# Patient Record
Sex: FEMALE | Employment: UNEMPLOYED | URBAN - METROPOLITAN AREA
[De-identification: names, ages, dates, MRNs, and addresses within clinical notes are randomized per-mention and may not be internally consistent; named-entity substitution may affect disease eponyms.]

---

## 2021-04-08 ENCOUNTER — HOSPITAL ENCOUNTER (EMERGENCY)
Facility: CLINIC | Age: 26
Discharge: HOME | End: 2021-04-09
Attending: EMERGENCY MEDICINE | Admitting: EMERGENCY MEDICINE
Payer: COMMERCIAL

## 2021-04-08 VITALS
DIASTOLIC BLOOD PRESSURE: 88 MMHG | HEART RATE: 92 BPM | RESPIRATION RATE: 16 BRPM | OXYGEN SATURATION: 99 % | TEMPERATURE: 97.7 F | WEIGHT: 165 LBS | SYSTOLIC BLOOD PRESSURE: 128 MMHG | BODY MASS INDEX: 26.63 KG/M2

## 2021-04-08 DIAGNOSIS — F33.2 SEVERE EPISODE OF RECURRENT MAJOR DEPRESSIVE DISORDER, WITHOUT PSYCHOTIC FEATURES (H): ICD-10-CM

## 2021-04-08 DIAGNOSIS — Z65.9 OTHER SOCIAL STRESSOR: ICD-10-CM

## 2021-04-08 LAB
ANION GAP SERPL CALCULATED.3IONS-SCNC: 5 MMOL/L (ref 3–14)
BASOPHILS # BLD AUTO: 0 10E9/L (ref 0–0.2)
BASOPHILS NFR BLD AUTO: 0.4 %
BUN SERPL-MCNC: 13 MG/DL (ref 7–30)
CALCIUM SERPL-MCNC: 9.3 MG/DL (ref 8.5–10.1)
CHLORIDE SERPL-SCNC: 108 MMOL/L (ref 94–109)
CO2 SERPL-SCNC: 25 MMOL/L (ref 20–32)
CREAT SERPL-MCNC: 0.69 MG/DL (ref 0.52–1.04)
DIFFERENTIAL METHOD BLD: ABNORMAL
EOSINOPHIL # BLD AUTO: 0.1 10E9/L (ref 0–0.7)
EOSINOPHIL NFR BLD AUTO: 1.1 %
ERYTHROCYTE [DISTWIDTH] IN BLOOD BY AUTOMATED COUNT: 16.4 % (ref 10–15)
GFR SERPL CREATININE-BSD FRML MDRD: >90 ML/MIN/{1.73_M2}
GLUCOSE BLDC GLUCOMTR-MCNC: 88 MG/DL (ref 70–99)
GLUCOSE SERPL-MCNC: 84 MG/DL (ref 70–99)
HCT VFR BLD AUTO: 38.8 % (ref 35–47)
HGB BLD-MCNC: 12.4 G/DL (ref 11.7–15.7)
IMM GRANULOCYTES # BLD: 0 10E9/L (ref 0–0.4)
IMM GRANULOCYTES NFR BLD: 0.3 %
LYMPHOCYTES # BLD AUTO: 2.5 10E9/L (ref 0.8–5.3)
LYMPHOCYTES NFR BLD AUTO: 36 %
MCH RBC QN AUTO: 27.2 PG (ref 26.5–33)
MCHC RBC AUTO-ENTMCNC: 32 G/DL (ref 31.5–36.5)
MCV RBC AUTO: 85 FL (ref 78–100)
MONOCYTES # BLD AUTO: 0.6 10E9/L (ref 0–1.3)
MONOCYTES NFR BLD AUTO: 8.7 %
NEUTROPHILS # BLD AUTO: 3.8 10E9/L (ref 1.6–8.3)
NEUTROPHILS NFR BLD AUTO: 53.5 %
NRBC # BLD AUTO: 0 10*3/UL
NRBC BLD AUTO-RTO: 0 /100
PLATELET # BLD AUTO: 230 10E9/L (ref 150–450)
POTASSIUM SERPL-SCNC: 3.2 MMOL/L (ref 3.4–5.3)
RBC # BLD AUTO: 4.56 10E12/L (ref 3.8–5.2)
SODIUM SERPL-SCNC: 138 MMOL/L (ref 133–144)
WBC # BLD AUTO: 7.1 10E9/L (ref 4–11)

## 2021-04-08 PROCEDURE — 85025 COMPLETE CBC W/AUTO DIFF WBC: CPT | Performed by: EMERGENCY MEDICINE

## 2021-04-08 PROCEDURE — 96361 HYDRATE IV INFUSION ADD-ON: CPT

## 2021-04-08 PROCEDURE — 99285 EMERGENCY DEPT VISIT HI MDM: CPT | Mod: 25

## 2021-04-08 PROCEDURE — 999N001017 HC STATISTIC GLUCOSE BY METER IP

## 2021-04-08 PROCEDURE — 90791 PSYCH DIAGNOSTIC EVALUATION: CPT

## 2021-04-08 PROCEDURE — 96360 HYDRATION IV INFUSION INIT: CPT

## 2021-04-08 PROCEDURE — 258N000003 HC RX IP 258 OP 636: Performed by: EMERGENCY MEDICINE

## 2021-04-08 PROCEDURE — 80048 BASIC METABOLIC PNL TOTAL CA: CPT | Performed by: EMERGENCY MEDICINE

## 2021-04-08 RX ADMIN — SODIUM CHLORIDE 1000 ML: 9 INJECTION, SOLUTION INTRAVENOUS at 23:08

## 2021-04-09 ASSESSMENT — ENCOUNTER SYMPTOMS
APPETITE CHANGE: 1
DYSPHORIC MOOD: 1

## 2021-04-09 NOTE — ED PROVIDER NOTES
"  History     Chief Complaint:  Mental Health Problem     HPI   Srinivas Noble is a 25 year old female with history of depression, anxiety, and anemia who presents with mental health problem. The patient states that recently she has felt as if her brain and body are \"shutting down.\" She says she feels heavy and does not have the strength to eat or drink. She says she is depressed but is motivated by her children to not consider suicide. 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 She also notes that she has chronic pain.     Review of Systems   Constitutional: Positive for appetite change.   Psychiatric/Behavioral: Positive for dysphoric mood and suicidal ideas.   All other systems reviewed and are negative.    Allergies:  Vitamin K  Iron  Ferumoxytol    Medications:  Gabapentin  Toradol    Past Medical History:    Anemia  h/o preeclampsia  History of congenital heart defect  Generalized anxiety disorder  Eczema  Depression     Past Surgical History:    Appendectomy x 3   section  Laparoscopic hysterectomy      Family History:    Psychiatric illness  ADHD    Social History:  Accompanied by brother.  Has children.  Uses marijuana.    Physical Exam     Patient Vitals for the past 24 hrs:   BP Temp Temp src Pulse Resp SpO2 Weight   21 2249 128/88 97.7  F (36.5  C) Temporal 92 16 99 % 74.8 kg (165 lb)     Physical Exam  Nursing note and vitals reviewed.  Constitutional: Cooperative. Laying on sided.    HENT:   Mouth/Throat: Mucous membranes are normal.   Cardiovascular: Normal rate, regular rhythm and normal heart sounds.    Pulmonary/Chest: Effort normal and breath sounds normal. No respiratory distress. No wheezes.   Abdominal: Soft. Normal appearance. There is no tenderness.   Neurological: Alert. Oriented x4.  Gait normal.   Skin: Skin is warm and dry.  Psychiatric: Depressed mood and affect. Suicidal thoughts without " plan.    Emergency Department Course     Laboratory:  CBC: WBC 7.1, HGB 12.4,   BMP: Potassium 3.2 (L) o/w WNL (Creatinine 0.69)      Reviewed:  I reviewed nursing notes, vitals, past medical history and care everywhere    Assessments:  2335 I obtained history and examined the patient as noted above.   0132 I rechecked the patient and explained findings and plan of care.     Consults:   0127 I consulted DEC regarding their assessment of the patient.    Interventions:  2308 NS 1000 mL IV    Disposition:  The patient was discharged to home.     Impression & Plan     Medical Decision Making:  Srinivas Noble is a 25 year old female who presents with depression symptoms. She has been evaluated by our DEC representative who was able to illicit more history. It sounds like she is having significant marriage issues and is considering leaving her . Recent stressors include the presence of a foster child that recently left as well as three biological children ages 6, 3, and 1. No suicidal plan. She is forward-thinking. No signs of psychosis. No indication for hospitalization. Our DEC representative was not able to schedule a definite outpatient mental health follow up appointment due to the patient's request for specifics in terms of the ethnicity of her therapist but they will have the care coordinator call her in the morning to continue to work on this. Patient has her brother with her who was able to provide safe transport home. Safety plan is going to be faxed to the patient and she will be discharged in stable condition.      Diagnosis:    ICD-10-CM    1. Severe episode of recurrent major depressive disorder, without psychotic features  F33.2    2. Social stressors  Z65.9          Scribe Disclosure:  I, Rachael Singh, am serving as a scribe at 11:43 PM on 4/8/2021 to document services personally performed by Addi Craig MD based on my observations and the provider's statements to me.      Addi Craig,  MD  04/09/21 0225

## 2021-04-09 NOTE — ED TRIAGE NOTES
Pt presents to ED with mental health concerns. States she has been very depressed. Has not been eating or drinking well lately. Feels lightheaded. Denies feeling suicidal as she has children she knows need her.

## 2021-04-12 ENCOUNTER — HOSPITAL ENCOUNTER (EMERGENCY)
Facility: CLINIC | Age: 26
Discharge: HOME | End: 2021-04-12
Attending: PHYSICIAN ASSISTANT | Admitting: PHYSICIAN ASSISTANT
Payer: COMMERCIAL

## 2021-04-12 VITALS
WEIGHT: 154.54 LBS | RESPIRATION RATE: 16 BRPM | OXYGEN SATURATION: 99 % | TEMPERATURE: 98.6 F | DIASTOLIC BLOOD PRESSURE: 75 MMHG | HEART RATE: 76 BPM | SYSTOLIC BLOOD PRESSURE: 163 MMHG | BODY MASS INDEX: 24.94 KG/M2

## 2021-04-12 DIAGNOSIS — Z20.822 EXPOSURE TO COVID-19 VIRUS: ICD-10-CM

## 2021-04-12 DIAGNOSIS — M54.9 BACK PAIN: ICD-10-CM

## 2021-04-12 PROCEDURE — 99282 EMERGENCY DEPT VISIT SF MDM: CPT

## 2021-04-12 ASSESSMENT — ENCOUNTER SYMPTOMS
BACK PAIN: 1
JOINT SWELLING: 1

## 2021-04-12 NOTE — ED PROVIDER NOTES
History   Chief Complaint:  Suspected Covid     HPI   Srinivas Noble is a 25 year old female who presents to the ED for evaluation of back pain and swelling.  Patient notes that she has been seen multiple times over the last few weeks for back pain, including in being evaluated here in the ED.  She has been following with a chiropractor as well as physical therapy.  She states that her back pain now has currently resolved, however she is concerned that the inflammation that she was experiencing in her back might be secondary to recent Covid.  She is wondering if she can get Covid antibody testing.      Review of Systems   Musculoskeletal: Positive for back pain and joint swelling.   All other systems reviewed and are negative.      Allergies:  Vitamin K  Iron  Centrum  Ferumoxytol    Medications:  Hydroxyzine  Tizanidine  Gabapentin     Past Medical History:    Iron deficiency anemia  Anxiety and depression   Eczema     Past Surgical History:    Appendectomy   section  Hysterectomy      Family History:    Father - hole in heart    Social History:  Accompanied to the ED by family.     Physical Exam     Patient Vitals for the past 24 hrs:   BP Temp Temp src Pulse Resp SpO2 Weight   21 1624 -- -- -- -- -- -- 70.1 kg (154 lb 8.7 oz)   21 1622 (!) 163/75 98.6  F (37  C) Temporal 76 16 99 % --       Physical Exam  Constitutional: Pleasant. Cooperative.  Eyes: Pupils equally round  HENT: Head is normal in appearance. Oropharynx is normal with moist mucus membranes.  Cardiovascular: Regular rate and rhythm without murmurs.  Respiratory: Normal respiratory effort, lungs clear to auscultation  Musculoskeletal: No asymmetry  Skin: Normal, without rash.  Neurologic: Cranial nerves grossly intact, normal cognition, no apparent deficits.  Psychiatric: Normal affect.  Nursing notes and vital signs reviewed.    Emergency Department Course     Emergency Department Course:    Reviewed:   I reviewed nursing  notes, vitals, past medical history and care everywhere    Assessments:  1735 I obtained history and examined the patient as noted above.     Disposition:  The patient was discharged to home.       Impression & Plan     Medical Decision Making:  Srinivas Noble is a 25 year old female who presents to the ED for evaluation of back pain and right lower extremity pain and swelling.  Patient has been seen previously for similar symptoms.  She is concerned that inflammation from her back might be secondary to recent Covid.  See HPI as above for additional details.  Vitals and physical exam as above.  We did discuss that Covid antibody test is not performed out of the emergency department.  Patient denies any symptoms at this time, denying any back pain or leg pain.  We discussed testing for Covid, however patient declined this.  We discussed potentially ordering lab work/imaging, however patient declined.  Ultimately, patient felt comfortable with close outpatient follow-up with recurrence of symptoms.  I felt this was very reasonable. Discussed reasons to return. All questions answered. Patient discharged to home in stable condition.    Covid-19  Srinivas Noble was evaluated during a global COVID-19 pandemic, which necessitated consideration that the patient might be at risk for infection with the SARS-CoV-2 virus that causes COVID-19.   Applicable protocols for evaluation were followed during the patient's care.   COVID-19 was considered as part of the patient's evaluation. The plan for testing is:  a test was obtained at a previous visit and reviewed & considered today.    Diagnosis:    ICD-10-CM    1. Back pain  M54.9    2. Exposure to COVID-19 virus  Z20.822      Scribe Disclosure:  I, Vanna Schafer, am serving as a scribe at 5:37 PM on 4/12/2021 to document services personally performed by Ronny Cedeno PA-C based on my observations and the provider's statements to me.     This record was created at least in  part using electronic voice recognition software, so please excuse any typographical errors.       Ronny Cedeno PA-C  04/12/21 5352

## 2021-04-12 NOTE — ED TRIAGE NOTES
Pt here for possible COVID exposure. A+Ox4, no acute signs of distress. States that she has generalized weakness and back pain.

## 2021-04-14 ENCOUNTER — HOSPITAL ENCOUNTER (EMERGENCY)
Facility: CLINIC | Age: 26
Discharge: HOME | End: 2021-04-14
Attending: PHYSICIAN ASSISTANT | Admitting: PHYSICIAN ASSISTANT
Payer: COMMERCIAL

## 2021-04-14 VITALS
TEMPERATURE: 98.9 F | SYSTOLIC BLOOD PRESSURE: 127 MMHG | HEART RATE: 100 BPM | DIASTOLIC BLOOD PRESSURE: 91 MMHG | BODY MASS INDEX: 24.75 KG/M2 | HEIGHT: 66 IN | WEIGHT: 154 LBS | RESPIRATION RATE: 20 BRPM | OXYGEN SATURATION: 100 %

## 2021-04-14 DIAGNOSIS — R63.8 POOR FLUID INTAKE: ICD-10-CM

## 2021-04-14 DIAGNOSIS — F33.1 MODERATE EPISODE OF RECURRENT MAJOR DEPRESSIVE DISORDER (H): Primary | ICD-10-CM

## 2021-04-14 LAB
AMPHETAMINES UR QL SCN: NEGATIVE
ANION GAP SERPL CALCULATED.3IONS-SCNC: 3 MMOL/L (ref 3–14)
BARBITURATES UR QL: NEGATIVE
BASOPHILS # BLD AUTO: 0 10E9/L (ref 0–0.2)
BASOPHILS NFR BLD AUTO: 0.3 %
BENZODIAZ UR QL: NEGATIVE
BUN SERPL-MCNC: 8 MG/DL (ref 7–30)
CALCIUM SERPL-MCNC: 9 MG/DL (ref 8.5–10.1)
CANNABINOIDS UR QL SCN: POSITIVE
CHLORIDE SERPL-SCNC: 106 MMOL/L (ref 94–109)
CO2 SERPL-SCNC: 28 MMOL/L (ref 20–32)
COCAINE UR QL: NEGATIVE
CREAT SERPL-MCNC: 0.65 MG/DL (ref 0.52–1.04)
DIFFERENTIAL METHOD BLD: ABNORMAL
EOSINOPHIL # BLD AUTO: 0 10E9/L (ref 0–0.7)
EOSINOPHIL NFR BLD AUTO: 0.5 %
ERYTHROCYTE [DISTWIDTH] IN BLOOD BY AUTOMATED COUNT: 16.5 % (ref 10–15)
GFR SERPL CREATININE-BSD FRML MDRD: >90 ML/MIN/{1.73_M2}
GLUCOSE SERPL-MCNC: 99 MG/DL (ref 70–99)
HCG UR QL: NEGATIVE
HCT VFR BLD AUTO: 39.3 % (ref 35–47)
HGB BLD-MCNC: 12.3 G/DL (ref 11.7–15.7)
IMM GRANULOCYTES # BLD: 0 10E9/L (ref 0–0.4)
IMM GRANULOCYTES NFR BLD: 0.3 %
LABORATORY COMMENT REPORT: NORMAL
LYMPHOCYTES # BLD AUTO: 1.1 10E9/L (ref 0.8–5.3)
LYMPHOCYTES NFR BLD AUTO: 18 %
MCH RBC QN AUTO: 26.3 PG (ref 26.5–33)
MCHC RBC AUTO-ENTMCNC: 31.3 G/DL (ref 31.5–36.5)
MCV RBC AUTO: 84 FL (ref 78–100)
MONOCYTES # BLD AUTO: 0.5 10E9/L (ref 0–1.3)
MONOCYTES NFR BLD AUTO: 7.7 %
NEUTROPHILS # BLD AUTO: 4.5 10E9/L (ref 1.6–8.3)
NEUTROPHILS NFR BLD AUTO: 73.2 %
NRBC # BLD AUTO: 0 10*3/UL
NRBC BLD AUTO-RTO: 0 /100
OPIATES UR QL SCN: NEGATIVE
PCP UR QL SCN: NEGATIVE
PLATELET # BLD AUTO: 300 10E9/L (ref 150–450)
POTASSIUM SERPL-SCNC: 3.2 MMOL/L (ref 3.4–5.3)
RBC # BLD AUTO: 4.67 10E12/L (ref 3.8–5.2)
SARS-COV-2 RNA RESP QL NAA+PROBE: NEGATIVE
SODIUM SERPL-SCNC: 137 MMOL/L (ref 133–144)
SPECIMEN SOURCE: NORMAL
T4 FREE SERPL-MCNC: 1.31 NG/DL (ref 0.76–1.46)
TSH SERPL DL<=0.005 MIU/L-ACNC: 0.15 MU/L (ref 0.4–4)
TSH SERPL DL<=0.005 MIU/L-ACNC: NORMAL MU/L (ref 0.4–4)
WBC # BLD AUTO: 6.1 10E9/L (ref 4–11)

## 2021-04-14 PROCEDURE — 84439 ASSAY OF FREE THYROXINE: CPT | Performed by: PHYSICIAN ASSISTANT

## 2021-04-14 PROCEDURE — 99204 OFFICE O/P NEW MOD 45 MIN: CPT | Performed by: PSYCHIATRY & NEUROLOGY

## 2021-04-14 PROCEDURE — 99207 PR CDG-CODE CATEGORY CHANGED: CPT | Performed by: PSYCHIATRY & NEUROLOGY

## 2021-04-14 PROCEDURE — 250N000013 HC RX MED GY IP 250 OP 250 PS 637: Performed by: PHYSICIAN ASSISTANT

## 2021-04-14 PROCEDURE — C9803 HOPD COVID-19 SPEC COLLECT: HCPCS

## 2021-04-14 PROCEDURE — 84443 ASSAY THYROID STIM HORMONE: CPT | Performed by: PHYSICIAN ASSISTANT

## 2021-04-14 PROCEDURE — 90791 PSYCH DIAGNOSTIC EVALUATION: CPT

## 2021-04-14 PROCEDURE — 99285 EMERGENCY DEPT VISIT HI MDM: CPT | Mod: 25

## 2021-04-14 PROCEDURE — 80048 BASIC METABOLIC PNL TOTAL CA: CPT | Performed by: PHYSICIAN ASSISTANT

## 2021-04-14 PROCEDURE — 85025 COMPLETE CBC W/AUTO DIFF WBC: CPT | Performed by: PHYSICIAN ASSISTANT

## 2021-04-14 PROCEDURE — 87635 SARS-COV-2 COVID-19 AMP PRB: CPT | Performed by: PHYSICIAN ASSISTANT

## 2021-04-14 PROCEDURE — 80307 DRUG TEST PRSMV CHEM ANLYZR: CPT | Performed by: PHYSICIAN ASSISTANT

## 2021-04-14 PROCEDURE — 81025 URINE PREGNANCY TEST: CPT | Performed by: PHYSICIAN ASSISTANT

## 2021-04-14 RX ORDER — MIRTAZAPINE 15 MG/1
TABLET, FILM COATED ORAL
Qty: 54 TABLET | Refills: 0 | Status: SHIPPED | OUTPATIENT
Start: 2021-04-14 | End: 2021-04-19

## 2021-04-14 RX ORDER — LORAZEPAM 1 MG/1
1 TABLET ORAL ONCE
Status: COMPLETED | OUTPATIENT
Start: 2021-04-14 | End: 2021-04-14

## 2021-04-14 RX ORDER — HYDROXYZINE HYDROCHLORIDE 25 MG/1
25 TABLET, FILM COATED ORAL 3 TIMES DAILY PRN
Qty: 60 TABLET | Refills: 0 | Status: SHIPPED | OUTPATIENT
Start: 2021-04-14

## 2021-04-14 RX ADMIN — LORAZEPAM 1 MG: 1 TABLET ORAL at 14:00

## 2021-04-14 ASSESSMENT — ENCOUNTER SYMPTOMS
VOMITING: 0
SHORTNESS OF BREATH: 0
NAUSEA: 0
DYSPHORIC MOOD: 0
ABDOMINAL PAIN: 0
FEVER: 0
NERVOUS/ANXIOUS: 1
DYSPHORIC MOOD: 1
SLEEP DISTURBANCE: 1

## 2021-04-14 ASSESSMENT — ACTIVITIES OF DAILY LIVING (ADL)
HYGIENE/GROOMING: INDEPENDENT
DRESS: STREET CLOTHES
ORAL_HYGIENE: INDEPENDENT

## 2021-04-14 ASSESSMENT — MIFFLIN-ST. JEOR
SCORE: 1450.29
SCORE: 1450.29

## 2021-04-14 NOTE — PLAN OF CARE
Affect flat, blunted. Patient agreeable to discharge plan. Discharge instructions reviewed with patient including follow-up care plan. Medications reviewed and prescriptions provided. Reviewed safety plan and outpatient resources. Denies SI and HI. All belongings that were brought into the hospital have been returned to patient. Escorted off the unit at 1855 accompanied by [facility] staff Discharged to home via significant other.
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
## 2021-04-14 NOTE — ED NOTES
Met with Srinivas from [id number] - she wanted to talk about her relationship with her  and concerns along with completing the safety plan.    If I am feeling unsafe or I am in a crisis, I will:   Contact my established care providers   Call the National Suicide Prevention Lifeline: [phone]   Go to the nearest emergency room   Call [phone]          Warning signs that I or other people might notice when a crisis is developing for me: heart racing, shaking and face gets scared     Things I am able to do on my own to cope or help me feel better: Listening to music and dancing      Things that I am able to do with others to cope or help me better: Do things with the kids, self-care     Things I can use or do for distraction: call mom, using coping skills such as reading, writing, breathing exercises     Changes I can make to support my mental health and wellness: Get help with day care and get a job    People in my life that I can ask for help: my sister in law    Your [facility] has a mental health crisis team you can call 24/7: [facility] Crisis Line Number: [phone]    Other things that are important when I m in crisis: Family    Additional resources and information:  None

## 2021-04-14 NOTE — ED PROVIDER NOTES
"History     Chief Complaint:  Psychiatric Evaluation and Dehydration       The history is provided by the patient.     Sirnivas Noble is a 25 year old female with a history of anemia, anxiety, depression, and chronic low back pain who presents for psychiatric evaluation. The patient is  and has children. She is quite close with her 's family, and is also close with her own family. Although she is close with her parents, she feels they are very judgmental citing cultural differences as they are from [place]. She has recently \"put together that her and her dad's relationship has triggered her \" on his cheating behavior. The patient herself is okay with this because of her own \"analysis of herself\", but this has taken a toll on her relationship with her , and also with her parents because they \"do not understand her\". She attends therapy and feels very in tune with her thoughts, sharing that she \"analyzes everything\".    Ultimately, given her anxiety surrounding her relationship with her parents, she has been \"consumed\" and has not been eating or drinking well. As a results, she presents today out of concern for dehydration and she \"needs fluids\". She denies nausea, vomiting, suicidal ideation, or worsening depression. She is \"very happy and knows she can help her \". She has been off of her antidepressants for 4-5 months now following issues with TMJ. She does follow with psychiatry and a therapist.       Review of Systems   Gastrointestinal: Negative for nausea and vomiting.   Psychiatric/Behavioral: Negative for dysphoric mood and suicidal ideas. The patient is nervous/anxious.    All other systems reviewed and are negative.    Allergies:  Iron   Vitamin K   Ferumoxytol      Medications:   Gabapentin   Toradol     Medical History:   Anemia   Congenital heart defect  Obesity   Generalized anxiety disorder   Depression   Eczema   Chronic right sided low back pain with right sided " "sciatica  Osteoarthritis of spine    Surgical History:  Appendectomy   C section x3  Laparoscopic hysterectomy w bilateral salpingectomy     Family History:   Father -  Congenital heart abnormality   Brother - psychiatric illness, ADHD    Social History:  The patient was unaccompanied to the ED.  She has children.   Drug Use: Yes - marijuana   Marital Status:   [facility]        Physical Exam     Patient Vitals for the past 24 hrs:   BP Temp Temp src Pulse Resp SpO2 Height Weight   04/14/21 1033 139/84 98.1  F (36.7  C) Temporal 111 20 100 % 1.676 m (5' 6\") 69.9 kg (154 lb)        Physical Exam  General: Alert and oriented to self, place, and time.   Head:  The scalp, face, and head appear normal. No evidence of head injury.  Eyes:  Conjunctivae and sclerae are normal. Pupils are equal, round, and reactive to light. Extraocular eye movements are intact.    ENT:    The oropharynx is normal    Uvula is in the midline     Moist mucous membranes.    Neck:  No lymphadenopathy  CV:  Regular rate and rhythm     Normal S1/S2  Resp:  Lungs are clear to auscultation    Non-labored    No rales or wheezing   GI:  Abdomen is soft, non-distended    No abdominal tenderness   MS:  Normal muscular tone. Moving all four extremities.   Skin:  No rash or acute skin lesions noted   Neuro: Speech is normal and fluent.   Psych:  Good eye contact. Plethora of ideas. She is directable, but then will soon talk in tangents. No obvious direction of conversation.   Emergency Department Course     Laboratory:    CBC: WBC 6.1, HGB 12.3,   BMP: Potassium 3.2 (L), o/w WNL (Creatinine 0.65)   TSH with free T4 reflex: 0.15 (L)    T4 free: 1.31     HCG Qualitative Urine: Negative    Drug abuse screen 77 urine: Cannabinoids Positive (A), o/w Negative     Asymptomatic COVID-19 (Coronavirus) PCR by Nasopharyngeal Swab: Negative      Emergency Department Course:    Reviewed:  I reviewed the patient's nursing " notes, vitals, past medical records, Care Everywhere.     Assessments:  1135 I obtained history and performed an exam of the patient, as documented above.     Disposition:  The patient was transferred to [facility].     Impression & Plan       Medical Decision Making:  Srinivas Noble is a 25 year old female who presents for evaluation of poor p.o. intake, and concern for dehydration.  The patient presents mildly tachycardic but otherwise vitally stable and afebrile.  She is overall well-appearing.  Upon discussion, she keeps mentioning concerns about her family and her  and has very pressured speech and an abnormal thought pattern.  She denies any suicidal or homicidal ideation.  However, given her presentation, I was concerned for possible need for mental health evaluation.  I did obtain basic labs which are overall reassuring.  UPT is negative.  Patient denies any ingestions.  She does not present suicidal and I do not feel ingestion labs are indicated at this time.  She has no complaints.  TSH is low, but free T4 is normal.  Covid swab is negative.  Therefore, I believe the patient is medically cleared.  I do not feel any further emergent work-up needs to be obtained at this time.  The patient was given p.o. fluids and her heart rate improved.  I feel she is safe to discharge to [facility] for further evaluation in regards to her mental health status.  All questions were answered prior to this discharged.  The patient understands and agrees to this plan.    Covid-19  Srinivas Noble was evaluated during a global COVID-19 pandemic, which necessitated consideration that the patient might be at risk for infection with the SARS-CoV-2 virus that causes COVID-19.   Applicable protocols for evaluation were followed during the patient's care.   COVID-19 was considered as part of the patient's evaluation. The plan for testing is:  a test was obtained during this visit.     Diagnosis:     ICD-10-CM    1. Poor fluid intake   R63.8         Scribe Disclosure:  I, Irene Lupo, am serving as a scribe at 10:35 AM on 4/14/2021 to document services personally performed by Rayne Lundberg PA-C based on my observations and the provider's statements to me.      Rayne Lundberg PA-C  04/15/21 1246

## 2021-04-14 NOTE — DISCHARGE INSTRUCTIONS
Individual Therapy - [facility]. [address], [address] [place]  [address],  [phone], Shemar Fajardo- 4/29 @ 9 am    Talk to the therapist about adding an additional therapist for Couples therapy    Dr. Dailey at [facility] - Medication Management  - get an appointment with provider as soon as possible.      Warning signs that I or other people might notice when a crisis is developing for me: heart racing, shaking and face gets scared     Things I am able to do on my own to cope or help me feel better: Listening to music and dancing      Things that I am able to do with others to cope or help me better: Do things with the kids, self-care     Things I can use or do for distraction: call mom, using coping skills such as reading, writing, breathing exercises     Changes I can make to support my mental health and wellness: Get help with day care and get a job    People in my life that I can ask for help: my sister in law    Your county has a mental health crisis team you can call 24/7: [facility] Crisis Line Number: [phone]    Other things that are important when I m in crisis: Family

## 2021-04-14 NOTE — ED PROVIDER NOTES
ED Psychiatric EmPATH Note  [facility] - EmPATH Unit    Srinivas Noble MRN: [id number]   Age: 25 year old YOB: 1995     History     Chief Complaint   Patient presents with     Psychiatric Evaluation     Dehydration     Patient presented to ED with dehydration due to lack of self-care associated with depression and anxiety. She reports that she has been having problems with the relationship with her . She has been focused on this and has lost her appetite. She also is not sleeping well. She denies suicidal thoughts. She has been treated with antidepresants in the past but apparently had issues with TMJ. That said, she is very insistent that she want to start something now for depression, anxiety, sleep and appetite.    The history is provided by the patient and medical records. No  was used.         Past Medical History  Past Medical History:   Diagnosis Date     Anemia      Past Surgical History:   Procedure Laterality Date     APPENDECTOMY  2013      SECTION       Laproscopic hysterectomy NOS       hydrOXYzine (ATARAX) 25 MG tablet  mirtazapine (REMERON) 15 MG tablet      Allergies   Allergen Reactions     Vitamin K      Iron Rash     IV iron     Family History  Family History   Problem Relation Age of Onset     Diabetes Paternal Grandmother      Diabetes Maternal Grandfather      Social History   Social History     Tobacco Use     Smoking status: Never Smoker   Substance Use Topics     Alcohol use: No     Drug use: Yes     Frequency: 7.0 times per week     Types: Marijuana      Past medical history, past surgical history, medications, allergies, family history, and social history were reviewed with the patient. No additional pertinent items.       Review of Systems   Constitutional: Negative for fever.   Respiratory: Negative for shortness of breath.    Cardiovascular: Negative for chest pain.   Gastrointestinal: Negative for abdominal  "pain.   Psychiatric/Behavioral: Positive for dysphoric mood and sleep disturbance. The patient is nervous/anxious.    All other systems reviewed and are negative.        Physical Examination   BP: 139/84  Pulse: 111  Temp: 98.1  F (36.7  C)  Resp: 20  Height: 167.6 cm (5' 6\")  Weight: 69.9 kg (154 lb)  SpO2: 100 %    Physical Exam  Vitals signs and nursing note reviewed.   HENT:      Head: Normocephalic and atraumatic.   Eyes:      Extraocular Movements: Extraocular movements intact.   Neck:      Musculoskeletal: Normal range of motion.   Pulmonary:      Effort: Pulmonary effort is normal.   Musculoskeletal: Normal range of motion.   Skin:     General: Skin is dry.   Neurological:      General: No focal deficit present.      Mental Status: She is alert and oriented to person, place, and time.           Psychiatric Examination   Appearance: awake, alert, adequately groomed and casually dressed  Attitude:  cooperative  Eye Contact:  good  Mood:  anxious  Affect:  mood congruent  Speech:  clear, coherent  Psychomotor Behavior:  no evidence of tardive dyskinesia, dystonia, or tics and intact station, gait and muscle tone  Throught Process:  goal oriented  Associations:  no loose associations  Thought Content:  no evidence of suicidal ideation or homicidal ideation and no evidence of psychotic thought  Insight:  fair  Judgement:  intact  Oriented to:  time, person, and place  Attention Span and Concentration:  intact  Recent and Remote Memory:  intact    ED Course        Labs Ordered and Resulted from Time of ED Arrival Up to the Time of Departure from the ED   BASIC METABOLIC PANEL - Abnormal; Notable for the following components:       Result Value    Potassium 3.2 (*)     All other components within normal limits   CBC WITH PLATELETS DIFFERENTIAL - Abnormal; Notable for the following components:    MCH 26.3 (*)     MCHC 31.3 (*)     RDW 16.5 (*)     All other components within normal limits   DRUG ABUSE SCREEN 77 " URINE (FL, RH, SH) - Abnormal; Notable for the following components:    Cannabinoids Qual Urine Positive (*)     All other components within normal limits   TSH WITH FREE T4 REFLEX - Abnormal; Notable for the following components:    TSH 0.15 (*)     All other components within normal limits   TSH   HCG QUALITATIVE URINE   SARS-COV-2 (COVID-19) VIRUS RT-PCR   T4 FREE       Assessments & Plan (with Medical Decision Making)   Patient presenting from emergency room after reporting lack of eating, drinking, and sleeping related to her mental health issues. She has been focused on relationship issues with her  and has been depressed and anxious along with the above symptoms. Patient also has been using cannabis for sleep. We discussed options and I advised her to not use cannabis as it has very little net clinical benefit for anxiety and can make depression worse. She understands that it is possible to have TMJ issues with another antidepressant, but she still wants to try something immediately. We discussed risks and benefits of treatment (including possibility of increased suicidal thinking) and she agrees to a trial of mirtazapine and hydroxyzine. She will follow up with her psychiatrist for refills.     Nursing notes reviewed.     I have reviewed the DEC assessment complete by Duncan Jansen dated 4/14/2021.    Preliminary diagnosis:  Major depressive disorder, recurrent, moderate  Generalized anxiety disorder      --  Duncan Garay MD   [facility] EMERGENCY DEPT  EmPATH Unit  4/14/2021        Duncan Garay MD  04/14/21 1809

## 2021-04-14 NOTE — PLAN OF CARE
"25 year old female received from ER due to anxiety and depression.  Denies SI/HI or self injury urges.  Patient has stress related to her parents and them telling her how to raise her children related to their culture.She states that she has a history of depression and stopped meds 4 months ago.  She also states she is using \"weed\" to help her sleep.  Patient is cooperative and anxious with pressured speech.  She would like increased rescources. Patient search completed with two staff members present. Nursing assessment complete including patient and medication profiles. Risk assessments completed addressing suicide and safety issues. Care plan initiated. Video monitoring in progress.     "

## 2021-04-14 NOTE — ED NOTES
"Pt is very anxious, rambling conversation about her family, narcissim, and how \"overwhelming\" her parents are.  \"Too much for me and my brain\"    "

## 2021-04-14 NOTE — ED TRIAGE NOTES
Here with mental health complaints. Having trouble eating and sleeping Has psychiatrist, has therapist. Story is all over the map.   No

## 2021-04-18 ENCOUNTER — HOSPITAL ENCOUNTER (OUTPATIENT)
Facility: CLINIC | Age: 26
Setting detail: OBSERVATION
Discharge: STILL ADMITTED OR ADMINISTRATIVE | End: 2021-04-20
Attending: EMERGENCY MEDICINE | Admitting: EMERGENCY MEDICINE
Payer: COMMERCIAL

## 2021-04-18 DIAGNOSIS — F41.1 GAD (GENERALIZED ANXIETY DISORDER): ICD-10-CM

## 2021-04-18 DIAGNOSIS — F31.60 BIPOLAR 1 DISORDER, MIXED (H): ICD-10-CM

## 2021-04-18 PROBLEM — F30.10 MANIC BEHAVIOR (H): Status: ACTIVE | Noted: 2021-04-18

## 2021-04-18 PROBLEM — R46.89 DISORGANIZED BEHAVIOR: Status: ACTIVE | Noted: 2021-04-18

## 2021-04-18 LAB
AMPHETAMINES UR QL SCN: NEGATIVE
BARBITURATES UR QL: NEGATIVE
BENZODIAZ UR QL: NEGATIVE
CANNABINOIDS UR QL SCN: POSITIVE
COCAINE UR QL: NEGATIVE
HCG UR QL: NEGATIVE
LABORATORY COMMENT REPORT: NORMAL
OPIATES UR QL SCN: NEGATIVE
PCP UR QL SCN: NEGATIVE
SARS-COV-2 RNA RESP QL NAA+PROBE: NEGATIVE
SPECIMEN SOURCE: NORMAL

## 2021-04-18 PROCEDURE — G0378 HOSPITAL OBSERVATION PER HR: HCPCS

## 2021-04-18 PROCEDURE — 80307 DRUG TEST PRSMV CHEM ANLYZR: CPT | Performed by: PSYCHIATRY & NEUROLOGY

## 2021-04-18 PROCEDURE — 87635 SARS-COV-2 COVID-19 AMP PRB: CPT | Performed by: EMERGENCY MEDICINE

## 2021-04-18 PROCEDURE — C9803 HOPD COVID-19 SPEC COLLECT: HCPCS

## 2021-04-18 PROCEDURE — 250N000013 HC RX MED GY IP 250 OP 250 PS 637: Performed by: PSYCHIATRY & NEUROLOGY

## 2021-04-18 PROCEDURE — 99285 EMERGENCY DEPT VISIT HI MDM: CPT | Mod: 25

## 2021-04-18 PROCEDURE — 81025 URINE PREGNANCY TEST: CPT | Performed by: PSYCHIATRY & NEUROLOGY

## 2021-04-18 RX ORDER — OLANZAPINE 10 MG/1
10 TABLET ORAL EVERY 6 HOURS PRN
Status: DISCONTINUED | OUTPATIENT
Start: 2021-04-19 | End: 2021-04-19

## 2021-04-18 RX ORDER — OLANZAPINE 5 MG/1
5 TABLET ORAL ONCE
Status: COMPLETED | OUTPATIENT
Start: 2021-04-18 | End: 2021-04-19

## 2021-04-18 RX ORDER — OLANZAPINE 5 MG/1
5 TABLET ORAL EVERY 4 HOURS PRN
Status: DISCONTINUED | OUTPATIENT
Start: 2021-04-18 | End: 2021-04-18

## 2021-04-18 RX ADMIN — OLANZAPINE 5 MG: 5 TABLET, FILM COATED ORAL at 21:06

## 2021-04-18 ASSESSMENT — ACTIVITIES OF DAILY LIVING (ADL)
DRESS: STREET CLOTHES
ORAL_HYGIENE: INDEPENDENT
HYGIENE/GROOMING: INDEPENDENT

## 2021-04-18 ASSESSMENT — ENCOUNTER SYMPTOMS
VOMITING: 0
DECREASED CONCENTRATION: 1
SLEEP DISTURBANCE: 0
HYPERACTIVE: 1
NERVOUS/ANXIOUS: 1

## 2021-04-18 ASSESSMENT — MIFFLIN-ST. JEOR
SCORE: 1428.97
SCORE: 1500.19

## 2021-04-18 NOTE — ED PROVIDER NOTES
"  History   Chief Complaint:  Depression       The history is provided by the patient and the spouse.      Srinivas Noble is a 25 year old female with history of depression and ROBYN who presents with her  for evaluation of a euphoric episode. The patient reports concern for multiple issues including her ancestry, bullying, her parents, her children, her marriage, among others. She makes disconnected statements such as \"I express myself through music\" and \"it has always been the whites\". She reports multiple revelations without completing her thoughts about what those realizations are. She has been sleeping about 6 hours every night. No self-harm or suicidal or homicidal ideation. No chest pain or vomiting. Her  states she has not had a euphoric episode like this in their 6 years of marriage. The , sister in law, and friends convinced the patient to come to the ED for evaluation.     The patient was seen in the ED and sent to [facility] on 2021 for a moderate major depressive episode. At that time, she noted marital problems, dehydration, loss of appetite, and difficulty sleeping. She was discharged with hydroxyzine and mirtazapine.     Review of Systems   Cardiovascular: Negative for chest pain.   Gastrointestinal: Negative for vomiting.   Psychiatric/Behavioral: Positive for decreased concentration. Negative for self-injury, sleep disturbance and suicidal ideas. The patient is nervous/anxious and is hyperactive.    All other systems reviewed and are negative.      Allergies:  Vitamin K  Iron    Medications:  Gabapentin   Hydroxyzine   Mirtazapine     Past Medical History:     Anemia  Depression   ROBYN  Obesity   Severe preeclampsia     Past Surgical History:    Appendectomy   section  Laparoscopic hysterectomy    Family History:    Diabetes    Social History:  Presents with her   PCP: Britta Negrete [place]    Physical Exam     Patient Vitals for the past 24 hrs:   " "BP Temp Temp src Pulse Resp SpO2 Height Weight   04/18/21 1855 (!) 153/115 97.9  F (36.6  C) Oral 113 16 100 % 1.676 m (5' 6\") 74.8 kg (165 lb)       Physical Exam  Vitals: reviewed by me  General: Pt seen on [facility], pleasant, cooperative, and alert to conversation  Eyes: Tracking well, clear conjunctiva BL  ENT: MMM, midline trachea.   Lungs: No tachypnea, no accessory muscle use. No respiratory distress.   CV: Rate as above  MSK: no joint effusion.  No evidence of trauma  Skin: No rash  Neuro: Clear speech and no facial droop.  Psych: Not RIS, no e/o AH/VH.  Very agitated, flight of ideas, ideas of grandeur, some paranoia.  Tangential speech and certainly pressured speech.  No suicidality, no homicidality.      Emergency Department Course     Laboratory:  Asymptomatic COVID19 Virus PCR by nasopharyngeal swab: Negative    Emergency Department Course:    Reviewed:  I reviewed nursing notes, vitals, past medical history and care everywhere    Assessments:  1910 I obtained history and examined the patient as noted above.     Disposition:  The patient was transferred to [facility].     Impression & Plan     Medical Decision Making:  Srinivas Noble is a very pleasant 25 year old female who presents to the emergency room with what appears to be significant anxiety as well as bizarre and tangential thinking. She initially struck me as possibly being manic, though she tells me she is sleeping quite well. She has ideas of grandiosity, also with some level of paranoia and very disorganized and pressured speech. She has no medical complaints, her covid is negative, and she will be transferred to the EmPATH unit. She feels comfortable with this plan, as does her  who is at bedside.       Covid-19  Srinivas Noble was evaluated during a global COVID-19 pandemic, which necessitated consideration that the patient might be at risk for infection with the SARS-CoV-2 virus that causes COVID-19.   Applicable protocols for " evaluation were followed during the patient's care.   COVID-19 was considered as part of the patient's evaluation. The plan for testing is:  a test was obtained during this visit.    Diagnosis:    ICD-10-CM    1. Anxiety  F41.9    2. Disorganized behavior  R46.89        Scribe Disclosure:  KENDRICK, Margarita Primo, am serving as a scribe at 6:53 PM on 4/18/2021 to document services personally performed by Oskar Luna MD based on my observations and the provider's statements to me.        Oskar Luna MD  04/18/21 6465

## 2021-04-19 PROCEDURE — G0378 HOSPITAL OBSERVATION PER HR: HCPCS

## 2021-04-19 PROCEDURE — 99225 PR SUBSEQUENT OBSERVATION CARE,LEVEL II: CPT | Performed by: PSYCHIATRY & NEUROLOGY

## 2021-04-19 PROCEDURE — 250N000013 HC RX MED GY IP 250 OP 250 PS 637: Performed by: PSYCHIATRY & NEUROLOGY

## 2021-04-19 RX ORDER — SENNOSIDES 8.6 MG
8.6 TABLET ORAL ONCE
Status: COMPLETED | OUTPATIENT
Start: 2021-04-19 | End: 2021-04-19

## 2021-04-19 RX ORDER — OLANZAPINE 10 MG/1
10 TABLET ORAL AT BEDTIME
Qty: 30 TABLET | Refills: 0 | Status: ON HOLD | OUTPATIENT
Start: 2021-04-19 | End: 2021-04-27

## 2021-04-19 RX ORDER — PROPRANOLOL HYDROCHLORIDE 10 MG/1
10 TABLET ORAL 2 TIMES DAILY PRN
COMMUNITY
End: 2021-04-19

## 2021-04-19 RX ORDER — OLANZAPINE 5 MG/1
5 TABLET ORAL EVERY 6 HOURS PRN
Status: DISCONTINUED | OUTPATIENT
Start: 2021-04-19 | End: 2021-04-20 | Stop reason: HOSPADM

## 2021-04-19 RX ORDER — OLANZAPINE 10 MG/1
10 TABLET ORAL AT BEDTIME
Status: DISCONTINUED | OUTPATIENT
Start: 2021-04-19 | End: 2021-04-20 | Stop reason: HOSPADM

## 2021-04-19 RX ORDER — DIPHENHYDRAMINE HCL 25 MG
50 CAPSULE ORAL AT BEDTIME
Status: DISCONTINUED | OUTPATIENT
Start: 2021-04-19 | End: 2021-04-20 | Stop reason: HOSPADM

## 2021-04-19 RX ADMIN — OLANZAPINE 10 MG: 10 TABLET, FILM COATED ORAL at 21:06

## 2021-04-19 RX ADMIN — SENNOSIDES 8.6 MG: 8.6 TABLET, FILM COATED ORAL at 14:58

## 2021-04-19 RX ADMIN — DIPHENHYDRAMINE HYDROCHLORIDE 50 MG: 25 CAPSULE ORAL at 21:06

## 2021-04-19 RX ADMIN — OLANZAPINE 5 MG: 5 TABLET, FILM COATED ORAL at 09:24

## 2021-04-19 ASSESSMENT — ACTIVITIES OF DAILY LIVING (ADL)
HYGIENE/GROOMING: HANDWASHING
DRESS: STREET CLOTHES
ORAL_HYGIENE: INDEPENDENT

## 2021-04-19 NOTE — PLAN OF CARE
"Srinivas Noble is a 25 year old female received from the ER due to manic behavior.  Patient has a history of depression and ROBYN. Per the ED RN report, the patient was brought to the ED by her  after having euphoric episode. Here on the unit, she presents as anxious and restless in mood. Speech is tangential. Thought process is disorganized, with paranoid ideation. She was observed making nonsensical statements such as: \"I'm going to save the world by dancing.\" Patient denies any suicidal ideation. Patient also denies any visual or auditory hallucinations. Reports the most recent stressors include not taking her mirtazapine because \"I could not afford it.\" Nursing assessment complete including patient and medication profiles. Risk assessments completed addressing suicide,fall, nutrition and safety issues. Care plan initiated. Assessments reviewed with LMHP and physician. Video monitoring in progress, patient informed.  Admission information reviewed with patient. Pt given tour of the unit and instructions on using the facility. Questions regarding the unit addressed. Pt search completed and belongings inventoried.     "

## 2021-04-19 NOTE — ED NOTES
"Patient and  aware that there is a camera in the room. Patient stated \"You can record me all you want. I'm totally fine with it.\"  "

## 2021-04-19 NOTE — PLAN OF CARE
"  Problem: Psychotic Symptoms  Goal: Psychotic Symptoms  Description: Signs and symptoms of listed problems will be absent or manageable.  Outcome: No Change  Flowsheets (Taken 4/19/2021 1729)  Psychotic Symptoms Assessed: all  Psychotic Symptoms Present:   affect   mood   anxiety   insight   thought process   sleep  Pt presents disorganized, confused and paranoid. She continues to be restless and hyperverbal. Pt approached Writer and asked, \"I know this is a weird request, but can I go outside and get some dirt. My leg feels weird and I just want to feel one with nature. I just want a pile of dirt\". Writer was able to redirect patient back into a sensory room, where she continued to listen to music and dance. Later in the shift, she was seen socializing on the unit with another patient reading them a magazine. She was stating delusions referencing her past and own life to certain images in the magazine. She was again redirectable to coloring activities. Denies SI/HI, but expresses she wants to stay overnight on the unit to feel safe. Plan is to have her reassessed in the morning by MD.      continues to be involved in care and has been informed of this plan. Writer has received two calls from him this shift expressing his concerns and to share what life has been like for her at home. He reports, \"She has had a long history of depression, but over the last 6 years, this is definitely the worst it's ever been. She sometimes will go around telling people she's the Anti-[place] and that her brother is trying to take over that role. She also has been going into our children's rooms at 2 am shouting  incantations\". Writer acknowledged concerns and informed him of the plan again and that she has been prescribed Zyprexa 10 mg at bedtime.      "
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
## 2021-04-19 NOTE — ED NOTES
Patient did not want to change into behavorial scrubs. Patient agreed to have security wand her. Security completed with this RN in the room as well as patient's spouse.
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
## 2021-04-19 NOTE — PROGRESS NOTES
"Writer met with patient to complete a safety plan and review outpatient appts.  Pt was tearful upon meeting, writer offered support and questioned what was happening that would cause her to feel emotional. Pt then produced a couple of pts of a flowsheet/chart and told a very long story about a young woman who came to live with her that she knew from her job at [employer].  The pt was observed to be hyper verbal, anxious and sad.  Her story had many layers and initially she also talked about knowing Oleg Sherman but she couldn't talk about that.  Ultimately, the pt reports she feels \"too panicky\" to return home.  She states she would like to listen to music, sing and dance and would take some medication for anxiety.  Writer offered inpatient mental health admission to which she declined.    Consulted with Dr. Manrique and updated him on pt's status and observed delusional thought process and increased anxiety.  He met with patient again and noted the pt was thumbing through a magazine and \"emotionally relating to many of the pictures she would find in the magazine.\"      Recommendation is for pt to stay on the empath unit one more night to continue observation and reassess in the morning to determine ultimate disposition.    Writer spoke with pt's , Oskar, to provide an update.  He was upset that he had not received any information for 24 hours.  Writer informed him of the plan for the patient.  He also had questions about what medications the pt was being given, specifically wondered if she was on an anti-psychotic, and writer deferred to the pt's RN.  Oskar would like the pt to be admitted to an inpatient mental health unit.  He states she has been in a \"manic state for over a week\".    Cathy Crenshaw, JOSE A    "

## 2021-04-19 NOTE — ED NOTES
Pt  called and asked about disposition. Stated still waiting for the doctor to assess and asked if can ask pt to call him.

## 2021-04-19 NOTE — PHARMACY-ADMISSION MEDICATION HISTORY
Pharmacy Medication History  Admission medication history interview status for the 4/18/2021  admission is complete. See EPIC admission navigator for prior to admission medications     Location of Interview: Patient room  Medication history sources: Patient and Surescripts    Significant changes made to the medication list:  Added propranolol    In the past week, patient estimated taking medication this percent of the time: greater than 90%    Additional medication history information:   Mirtazipine was not covered by her insurance per pt, so she did not   Gabapentin made her sleepy, so she did not continue taking.  Does not need Ketorolac prn from 3/9/21 anymore    Medication reconciliation completed by provider prior to medication history? No    Time spent in this activity: 15 minutes    Prior to Admission medications    Medication Sig Last Dose Taking? Auth Provider   hydrOXYzine (ATARAX) 25 MG tablet Take 1 tablet (25 mg) by mouth 3 times daily as needed for anxiety prn Yes Duncan Garay MD   propranolol (INDERAL) 10 MG tablet Take 10 mg by mouth 2 times daily as needed (anxiety) prn Yes Unknown, Entered By History   mirtazapine (REMERON) 15 MG tablet Take 1 tablet (15 mg) by mouth At Bedtime for 6 days, THEN 2 tablets (30 mg) At Bedtime for 24 days. did not start  Duncan Garay MD       The information provided in this note is only as accurate as the sources available at the time of update(s)

## 2021-04-19 NOTE — PLAN OF CARE
Pt. was very restless and hyperverbal early AM. Several conversations with writer about trauma she has experienced growing up and stress she is experiencing as a mother of three. Responded well to support and reassurance. Made some delusional comments at times. Paranoia at times. Calmed and currently resting after talking and receiving dose of Zyprexa.

## 2021-04-19 NOTE — ED PROVIDER NOTES
"ED Observation Psychiatric [facility] Emergency Department - [facility] Unit  Observation Initiation Date: *Apr 18, 2021    Srinivas Noble MRN: [id number]   Age: 25 year old YOB: 1995     History     Chief Complaint   Patient presents with     Depression     HPI  Srinivas Noble is a 25 year old female with PMH notable for anxiety, depression, and cannabis dependence admitted to the ED Observation Unit with manic behaviors.  Patient recently seen in [facility] on 4/14/21 for worsening anxiety and depression symptoms.  Was discharged same day evaluated with hydroxyzine and mirtazapine.  Reportedly did not  the prescriptions.  Patient had drug screen positive for cannabis during her 4/14/2021 visit.  Patient quite manic and very disorganized so is currently an unreliable historian.  Spoke with patient's  in one of the family conference rooms (patient did sign a release of information to speak with ).  He reports the patient has become increasingly labile since discharge on the 14th.  He does admit she has seemed to significantly decrease her cannabis use.  She is not sleeping.  He states she will talk about many subjects that are unrelated back to back to back.  He often does not know what she is talking about.  She also was reportedly chanting incantations over her children in the middle of the night the other day.  Patient has been unable to appropriately take care of herself.  Reportedly disowned her family members a few days ago.  Not acting like herself.  Patient's  does fear for her safety because of how she is acting but there have been no overt suicidal threats or self-harm behaviors.  Patient has also not threatened other people.    Patient was interviewed on the unit.  When I asked how she has been feeling today she states   \"listened.\"  I was not sure what she said and so she repeated it again.  She then stated \"heard.\"  She feels like people are listening to her.  " "She was quite disorganized and had flight of ideas.  She is hard to follow.  She also reported she was here because she thought she was narcissistic.  She then talked about her children and how much she loves them.  At some point she mentioned she needs to have white children \"so they can fight.\"  Patient quickly started to become agitated in the interview after I told her that her  was unable to watch her sleep on the unit.  Interview was terminated.  Patient was agreeable to taking medication and staying overnight for observation.    Past Medical History  Past Medical History:   Diagnosis Date     Anemia      Past Surgical History:   Procedure Laterality Date     APPENDECTOMY  2013      SECTION       Laproscopic hysterectomy NOS       hydrOXYzine (ATARAX) 25 MG tablet  mirtazapine (REMERON) 15 MG tablet      Allergies   Allergen Reactions     Vitamin K      Iron Rash     IV iron     Family History  Family History   Problem Relation Age of Onset     Diabetes Paternal Grandmother      Diabetes Maternal Grandfather      Social History   Social History     Tobacco Use     Smoking status: Never Smoker   Substance Use Topics     Alcohol use: No     Drug use: Yes     Frequency: 7.0 times per week     Types: Marijuana      Past medical history, past surgical history, medications, allergies, family history, and social history were reviewed with the patient. No additional pertinent items.       Review of Systems  A complete review of systems was attempted but limited due to altered mental status.    Physical Examination   BP: (!) 153/115  Pulse: 113  Temp: 97.9  F (36.6  C)  Resp: 16  Height: 167.6 cm (5' 6\")  Weight: 74.8 kg (165 lb)  SpO2: 100 %    Physical Exam  General: Awake, alert. Appears stated age.  Mildly disheveled.  Neuro: alert. CN II-XII grossly intact by observation. Grossly normal strength in all extremities by observation.   Integumentary/Skin: no rash visualized, normal " "color    Psychiatric Examination   Appearance: awake, alert and Mildly disheveled, appears stated age, appears mildly distressed.  Attitude:  Mostly cooperative; difficult due to manic behaviors but patient is fairly easily redirectable  Eye Contact:  intense  Mood:  \"Really good\"  Affect:  intensity is heightened  Speech:  pressured speech  Psychomotor Behavior:  physical agitation  Throught Process:  disorganized and Flight of ideas  Associations:  loosening of associations present  Thought Content:  no evidence of suicidal ideation or homicidal ideation and Not responding to internal stimuli  Insight:  Impaired  Judgement:  Impaired  Oriented to:  Person  Attention Span and Concentration:  Both impaired  Recent and Remote Memory:  Difficult to assess due to current mental health decompensation    ED Course        Labs Ordered and Resulted from Time of ED Arrival Up to the Time of Departure from the ED   SARS-COV-2 (COVID-19) VIRUS RT-PCR   DRUG ABUSE SCREEN 77 URINE (FL, RH, SH)   HCG QUALITATIVE URINE       Assessments & Plan (with Medical Decision Making)   Patient presenting with manic behaviors.  Patient does have a history of substance use and so drug screen ordered to rule out substance-induced trevor.  Pregnancy screen also ordered.  Also possibility of bipolar disorder, currently manic with psychotic features.  As needed olanzapine ordered.  Patient seems to be responding well already to first dose given.  Admit seems likely if continues to exhibit manic behaviors since she is currently unable to care for herself appropriately and  concerned about behaviors.  Patient would be holdable if requests to leave.  Nursing notes reviewed.     I have reviewed the DEC assessment complete by JOSE A Martinez dated 4/18/2021.    The patient was found to have a psychiatric condition that would benefit from an observation stay in the emergency department for further psychiatric stabilization and/or " coordination of a safe disposition. The observation plan includes serial assessments of psychiatric condition, potential administration of medications if indicated, further disposition pending the patient's psychiatric course during the monitoring period.     Preliminary diagnosis:  Manic behavior with psychotic features   (R/O Substance-Induced vs Bipolar Disorder)    --  Venecia Johnson DO   [facility] EMERGENCY DEPT  EmPATH Unit  4/18/2021        Venecia Johnson DO  04/18/21 [id number]

## 2021-04-19 NOTE — ED NOTES
Received phone call from pt father [phone] who stated he wanted the hospital staff to know that pt does use THC.
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
## 2021-04-20 ENCOUNTER — TELEPHONE (OUTPATIENT)
Dept: BEHAVIORAL HEALTH | Facility: CLINIC | Age: 26
End: 2021-04-20

## 2021-04-20 ENCOUNTER — HOSPITAL ENCOUNTER (INPATIENT)
Facility: CLINIC | Age: 26
LOS: 7 days | Discharge: LEFT BEFORE DISCHARGE | End: 2021-04-27
Attending: PSYCHIATRY & NEUROLOGY | Admitting: PSYCHIATRY & NEUROLOGY
Payer: COMMERCIAL

## 2021-04-20 VITALS
BODY MASS INDEX: 23.99 KG/M2 | TEMPERATURE: 99.2 F | OXYGEN SATURATION: 91 % | HEIGHT: 66 IN | HEART RATE: 70 BPM | DIASTOLIC BLOOD PRESSURE: 78 MMHG | RESPIRATION RATE: 18 BRPM | SYSTOLIC BLOOD PRESSURE: 141 MMHG | WEIGHT: 149.3 LBS

## 2021-04-20 DIAGNOSIS — F31.9 BIPOLAR 1 DISORDER (H): Primary | ICD-10-CM

## 2021-04-20 PROBLEM — F30.9 MANIA (H): Status: ACTIVE | Noted: 2021-04-20

## 2021-04-20 PROCEDURE — 250N000013 HC RX MED GY IP 250 OP 250 PS 637: Performed by: PSYCHIATRY & NEUROLOGY

## 2021-04-20 PROCEDURE — 99217 PR OBSERVATION CARE DISCHARGE: CPT | Performed by: PSYCHIATRY & NEUROLOGY

## 2021-04-20 PROCEDURE — G0378 HOSPITAL OBSERVATION PER HR: HCPCS

## 2021-04-20 PROCEDURE — 250N000013 HC RX MED GY IP 250 OP 250 PS 637: Performed by: STUDENT IN AN ORGANIZED HEALTH CARE EDUCATION/TRAINING PROGRAM

## 2021-04-20 PROCEDURE — 124N000002 HC R&B MH UMMC

## 2021-04-20 RX ORDER — ACETAMINOPHEN 325 MG/1
650 TABLET ORAL EVERY 4 HOURS PRN
Status: DISCONTINUED | OUTPATIENT
Start: 2021-04-20 | End: 2021-04-27 | Stop reason: HOSPADM

## 2021-04-20 RX ORDER — LANOLIN ALCOHOL/MO/W.PET/CERES
3 CREAM (GRAM) TOPICAL
Status: DISCONTINUED | OUTPATIENT
Start: 2021-04-20 | End: 2021-04-27 | Stop reason: HOSPADM

## 2021-04-20 RX ORDER — OLANZAPINE 10 MG/1
10 TABLET ORAL 3 TIMES DAILY PRN
Status: DISCONTINUED | OUTPATIENT
Start: 2021-04-20 | End: 2021-04-21

## 2021-04-20 RX ORDER — HYDROXYZINE HYDROCHLORIDE 25 MG/1
25 TABLET, FILM COATED ORAL EVERY 4 HOURS PRN
Status: DISCONTINUED | OUTPATIENT
Start: 2021-04-20 | End: 2021-04-27 | Stop reason: HOSPADM

## 2021-04-20 RX ORDER — MAGNESIUM HYDROXIDE/ALUMINUM HYDROXICE/SIMETHICONE 120; 1200; 1200 MG/30ML; MG/30ML; MG/30ML
30 SUSPENSION ORAL EVERY 4 HOURS PRN
Status: DISCONTINUED | OUTPATIENT
Start: 2021-04-20 | End: 2021-04-27 | Stop reason: HOSPADM

## 2021-04-20 RX ORDER — OLANZAPINE 10 MG/1
10 TABLET ORAL AT BEDTIME
Status: DISCONTINUED | OUTPATIENT
Start: 2021-04-20 | End: 2021-04-21

## 2021-04-20 RX ORDER — HYDROXYZINE HYDROCHLORIDE 25 MG/1
25 TABLET, FILM COATED ORAL 3 TIMES DAILY PRN
Status: DISCONTINUED | OUTPATIENT
Start: 2021-04-20 | End: 2021-04-20 | Stop reason: DRUGHIGH

## 2021-04-20 RX ORDER — OLANZAPINE 10 MG/2ML
10 INJECTION, POWDER, FOR SOLUTION INTRAMUSCULAR 3 TIMES DAILY PRN
Status: DISCONTINUED | OUTPATIENT
Start: 2021-04-20 | End: 2021-04-21

## 2021-04-20 RX ORDER — AMOXICILLIN 250 MG
1 CAPSULE ORAL 2 TIMES DAILY PRN
Status: DISCONTINUED | OUTPATIENT
Start: 2021-04-20 | End: 2021-04-27 | Stop reason: HOSPADM

## 2021-04-20 RX ADMIN — OLANZAPINE 5 MG: 5 TABLET, FILM COATED ORAL at 04:57

## 2021-04-20 RX ADMIN — MELATONIN TAB 3 MG 3 MG: 3 TAB at 20:28

## 2021-04-20 RX ADMIN — DOCUSATE SODIUM 50 MG AND SENNOSIDES 8.6 MG 1 TABLET: 8.6; 5 TABLET, FILM COATED ORAL at 20:33

## 2021-04-20 RX ADMIN — OLANZAPINE 10 MG: 10 TABLET, FILM COATED ORAL at 18:24

## 2021-04-20 RX ADMIN — OLANZAPINE 5 MG: 5 TABLET, FILM COATED ORAL at 10:38

## 2021-04-20 RX ADMIN — HYDROXYZINE HYDROCHLORIDE 25 MG: 25 TABLET, FILM COATED ORAL at 19:55

## 2021-04-20 RX ADMIN — OLANZAPINE 10 MG: 10 TABLET, FILM COATED ORAL at 20:27

## 2021-04-20 ASSESSMENT — ACTIVITIES OF DAILY LIVING (ADL)
DIFFICULTY_COMMUNICATING: NO
ORAL_HYGIENE: INDEPENDENT
HEARING_DIFFICULTY_OR_DEAF: NO
DRESS: INDEPENDENT
DRESS: STREET CLOTHES
TOILETING_ISSUES: NO
WALKING_OR_CLIMBING_STAIRS_DIFFICULTY: NO
ORAL_HYGIENE: INDEPENDENT
HEARING_DIFFICULTY_OR_DEAF: NO
CONCENTRATING,_REMEMBERING_OR_MAKING_DECISIONS_DIFFICULTY: NO
ADL_ASSESSMENT: EX;ALL;HYGIENE
DIFFICULTY_EATING/SWALLOWING: NO
WEAR_GLASSES_OR_BLIND: NO
FALL_HISTORY_WITHIN_LAST_SIX_MONTHS: NO
LAUNDRY: WITH SUPERVISION
PATIENT_/_FAMILY_COMMUNICATION_STYLE: SPOKEN LANGUAGE (ENGLISH OR BILINGUAL)
INTERPRETER_SERVICES_OFFERED_TO_THE_PATIENT: NO
HYGIENE/GROOMING: INDEPENDENT
INTERPRETER_SERVICES_OFFERED_TO_THE_PATIENT: NO
DRESSING/BATHING_DIFFICULTY: NO
HYGIENE/GROOMING: HANDWASHING
DOING_ERRANDS_INDEPENDENTLY_DIFFICULTY: NO

## 2021-04-20 ASSESSMENT — MIFFLIN-ST. JEOR: SCORE: 1438.5

## 2021-04-20 NOTE — PLAN OF CARE
"Pt. has been labile and disorganized all morning. Rambling about \"being judged\" and memories of childhood trauma. Fearful. Tearful. Dancing and singing in the sensory room. Ate well. Given prn zyprexa. Talked with  x 1 about plan of care and requested / given suggestions on how he can support patient.     "

## 2021-04-20 NOTE — ED NOTES
Bed placement determined. Transferring to [facility]  Station 22. Report called to Brenda. Transportation arranged

## 2021-04-20 NOTE — TELEPHONE ENCOUNTER
S:  8:45 AM  Call from Em[facility] unit requesting IP MH placement for a 24 YO F    B:  Hx of MDD, ROBYN, and cannabis dependence presented to the ED on 4/18/21 w/ manic behaviors.  Had been seen in ED  on 4/14/21 and discharged w/ new prescriptions and follow up appt  scheduled but did not  prescriptions or see MH provider.  This is her first manic episode, and even after a few doses of zyprexa , remains  delusional,  Loose associations, running around, dancing, can't sleep.  Reports strained relationship with her  and  she uses marijuana to help with her depression.  Denies any other substance use.     Denies any acute medical problems  VSS  HCG-NEG-had a hysterectomy after her 3rd child 3 years ago  COVID-19-NEG  TSH-0.15(L)  CBC w/ plat-WNL  BMP-k+-3.2(L)    A: voluntary    R:  Patient cleared and ready for behavioral bed placement: Yes     R:  11:30 AM  Patient accepted by [facility] on 22.  Placed in queue and called and gave charge nurse disposition.  Per charge, she will call patient's RN in Em[facility] unit for nurse to nurse in about an hour.  Writer called and updated patient's RN.

## 2021-04-20 NOTE — PROGRESS NOTES
04/20/21 1503   Patient Belongings   Did you bring any home meds/supplements to the hospital?  No   Patient Belongings remains with patient   Patient Belongings Remaining with Patient bracelet;clothing;other (see comments)   Belongings Search Yes   Clothing Search Yes   Second Staff Brenda KLEIN       ~In pt locker on st 22~  Rodriguez jacket  Yoga pants  Underwear  Tank top  Mask  2 bracelets  2 hair binders    *bible,magazines and papers remain with pt  *pt had no cash/phone upon admission    A               Admission:  I am responsible for any personal items that are not sent to the safe or pharmacy.  [facility] is not responsible for loss, theft or damage of any property in my possession.    Signature:  _________________________________ Date: _______  Time: _____                                              Staff Signature:  ____________________________ Date: ________  Time: _____      2nd Staff person, if patient is unable/unwilling to sign:    Signature: ________________________________ Date: ________  Time: _____     Discharge:  [facility] has returned all of my personal belongings:    Signature: _________________________________ Date: ________  Time: _____                                          Staff Signature:  ____________________________ Date: ________  Time: _____

## 2021-04-20 NOTE — ED NOTES
Treatment Plan    Client's Name: Srinivas Noble  YOB: 1995    DSM-5 Diagnoses: F31.4 Bipolar II disorder, most recent episode hypomanic (H)   Psychosocial / Contextual Factors: Patient presented to the emPATH unit with the following concerns: manic behavior and communication    Anticipated number of sessions or this episode of care: 1-4      MeasurableTreatment Goal(s) related to diagnosis / functional impairment(s)    Goal 1: Patient will lean 1 regulation skill for current mood concerns.    Objective #A                 Patient will Patient will demonstrate understanding of current presentation and learn coping skills for current state of dysregulation.    Status: New as of April 20, 2021    Intervention(s)  LMHP will provide support and empathy in order and teach coping skills.                Appearance:   Appropriate    Eye Contact:   Fair    Psychomotor Behavior: Normal  Agitated    Attitude:   Friendly Pleasant   Orientation:   All   Speech    Rate / Production: Pressured  Normal     Volume:  Normal    Mood:    Hypomanic    Affect:    Appropriate  Expansive    Thought Content:  Delusions fixations and loose associations   Thought Form:  Flight of Ideas    Insight:    Denial of Disorder          PLAN:   Patient will board in emPATH until patient and treatment deem it appropriate to either discharge or admit to a higher level of care. Details: Currently being assessed for inpatient services    Patient has not reviewed nor agreed to the above plan.    MARCOS Appiah                                                           ________

## 2021-04-20 NOTE — PROGRESS NOTES
Pt calmed down briefly but continued to come to Nurse station talking fast and loudly.  is sitting with her at this time.

## 2021-04-20 NOTE — PROGRESS NOTES
Srinivas admitted 1445 via ambulance transfer from [facility] ED-vol admit to station 22-clothing and belongings search completed-oriented to unit and program-Dr Main notified pulse 150-pt manic, restless, frightened-will reassess post adjusts to unit

## 2021-04-20 NOTE — PROGRESS NOTES
"Pt woke and came to Nurse station upset and wanting to call he brother. \" I can feel it, something is wrong with him.\" She began talking about her Mom and brother. Charge RN walked her to sensory room and is with her a this time.   "

## 2021-04-20 NOTE — PLAN OF CARE
Transport arrived to transfer patient to [facility]. Report has been given to station 22. Discharged off the unit at 1410. All belongings that where brought in with patient have been returned.

## 2021-04-20 NOTE — PROGRESS NOTES
Pt was getting more restless, talking fast and loudly, repeating her stories over and over. She is pleasant and thankful to staff, but manic/hyper/anxious. Chants out loud, holds arms in air and needs redirection. Given PRN dose of Zyprexa 5 mg po.

## 2021-04-20 NOTE — ED PROVIDER NOTES
ED Observation Psychiatric Discharge Summary   [facility]  Discharge Date: 4/20/2021    Srinivas Noble MRN: [id number]   Age: 25 year old YOB: 1995     Brief HPI & Initial ED Course     Chief Complaint   Patient presents with     Depression     HPI  Srinivas Noble is a 25 year old female with PMH notable for anxiety, depression, and cannabis dependence admitted to the ED Observation Unit with manic behaviors.  [facility] for worsening anxiety and depression symptoms.  Was discharged same day evaluated with hydroxyzine and mirtazapine.  Reportedly did not  the prescriptions.  Patient had drug screen positive for cannabis during her this visit as well as her last visit.      See ED Observation H&P for further details on the patient's presenting history and initial evaluation.     Overnight, the patient was noted to be emotionally labile, euphoric at times while dancing and singing, tearful, and disorganized.  Sleep was limited despite taking Zyprexa.  Staff has communicated treatment plans to her  who has called often to check on her status.    On examination today, the patient was sitting in her room reading a magazine.  Similar to yesterday, she would find various pictures throughout the magazine and would interpret personal meaning through those pictures.  She would quickly become tearful and cry.  Out of context, she explains that her father was correct despite the resentment that she held towards him for his decision.  She explains that he always stated that she has no values however she has decided to adopt her father's values.  She values her marriage and wants her marriage to work out.  She is hopeful to be discharged home soon however understands that she is experiencing in intensity that would benefit from hospitalization.  She is agreeable to transfer to the inpatient setting today.    No medication side effects  "reported.  No reports of suicidal or homicidal thoughts.  No reports of auditory or visual hallucinations although that there does appear to be mild to moderate paranoia as she questions whether her  is safe, whether she is safe, and requires much reassurance to ensure that they are both safe.    Physical Examination   BP: 122/86  Pulse: 56  Temp: 98.8  F (37.1  C)  Resp: 16  Height: 167.6 cm (5' 6\")  Weight: 67.7 kg (149 lb 4.8 oz)  SpO2: 99 %    Physical Exam  General: Appears stated age.   Neuro: Alert and fully oriented. Extremities appear to demonstrate normal strength on visual inspection.   Integumentary/Skin: no rash visualized, normal color    Psychiatric Examination   Appearance: awake, alert and dressed in hospital scrubs  Attitude:  cooperative and guarded  Eye Contact:  fair  Mood:  anxious and sad   Affect:  labile  Speech:  pressured speech  Psychomotor Behavior:  no evidence of tardive dyskinesia, dystonia, or tics  Throught Process:  disorganized and tangental  Associations:  no loose associations  Thought Content:  no evidence of suicidal ideation or homicidal ideation and Mild to moderate paranoia noted  Insight:  partial  Judgement:  fair  Oriented to:  time, person, and place  Attention Span and Concentration:  limited  Recent and Remote Memory:  fair    Results        Labs Ordered and Resulted from Time of ED Arrival Up to the Time of Departure from the ED   DRUG ABUSE SCREEN 77 URINE (FL, RH, SH) - Abnormal; Notable for the following components:       Result Value    Cannabinoids Qual Urine Positive (*)     All other components within normal limits   SARS-COV-2 (COVID-19) VIRUS RT-PCR   HCG QUALITATIVE URINE       Observation Course   The patient was found to have a psychiatric condition that would benefit from an observation stay in the emergency department for further psychiatric stabilization and/or coordination of a safe disposition. The plan upon observation admission included " serial assessments of psychiatric condition, potential administration of medications if indicated, further disposition pending the patient's psychiatric course during the monitoring period.     Serial assessments of the patient's psychiatric condition were performed. Nursing notes were reviewed. During the observation period, the patient did not require medications for agitation, and did not require restraints/seclusion for patient and/or provider safety.    After a period in observation care, it was determined that extending her treatment course in the EmPATH unit would likely not result in enough improvements to transition back to the outpatient setting.  Therefore, the decision was made to pursue admission to inpatient psychiatry.    Discharge Diagnoses:   Final diagnoses:   ROBYN (generalized anxiety disorder)   Bipolar 1 disorder, mixed (H)     Medication plan:  Continue Zyprexa which was increased last night to 10 mg at bedtime.  She appears to be tolerating the medication well as we await therapeutic response.  Consider adding lithium for additional augmentation.    Disposition: Transfer to inpatient psychiatry today for treatment of ongoing symptoms related to a severe mixed episode associated with a presumed bipolar disorder.    --  Mariano Manrique MD  [facility] EMERGENCY DEPT  EmPATH Unit  4/20/2021        Mariano Manrique MD  04/20/21 0845
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
## 2021-04-20 NOTE — ED NOTES
Progress Note    Client Name: Srinivas Noble  Date: 4/20/21         Service Type: [id number] - Psychotherapy (with patient) - 30 (16-37*) min      Session Start Time: 5:36 am  Session End Time: 6:02 am      Session Length: .50 hrs     Session #: 1        DATA      Progress Since Last Session (Related to Symptoms / Goals / Homework):   Symptoms: No change Pt presented similarly to yesterday at assessment        Episode of Care Goal Progress:               Pt demonstrated some insight into current presentation but also presented defensively about her symptoms     Current / Ongoing Stressors and Concerns:   Pt reports concerns about her children and worries about being perceived as crazy to others     Treatment Objective(s) Addressed in This Session:   Focused on gaining awareness about current emotional state and making plans for disposition     Intervention:   Motivational Interviewing: supported pt in identifying current level of crisis  Solution Focused: Focused on current moment and awareness of emotional state        ASSESSMENT: Current Emotional / Mental Status (status of significant symptoms):   Risk status (Self / Other harm or suicidal ideation)  Client denies a history of and other safety concerns  Level of trevor puts pt at risk  Client denies current fears or concerns for personal safety.   Client denies current or recent suicidal ideation or behaviors.   Client denies current or recent homicidal ideation or behaviors.   Client denies current or recent self injurious behavior or ideation.   Client denies other safety concerns.   A safety and risk management plan has been developed including: Collaborative care team was informed of patient's risk status and plan.     Appearance:   Appropriate    Eye Contact:   Fair    Psychomotor Behavior: Agitated    Attitude:   Guarded    Orientation:   All   Speech    Rate / Production: Pressured  Talkative    Volume:  Normal    Mood:    Hypomanic    Affect:    Expansive     Thought Content:  Delusions odd fixations    Thought Form:  Flight of Ideas    Insight:    Impaired     Medication Review:   No changes to current psychiatric medication(s)     Medication Compliance:   No     Changes in Health Issues:   None reported     Chemical Use Review:   Substance Use: Chemical use reviewed, no active concerns identified      Tobacco Use: No current tobacco use.       Collateral Reports Completed:   Not Applicable    PLAN: (Client Tasks / Therapist Tasks / Other)  Provide information to Dr. Manrique referring psychiatrist        Esperanza Larkin, [facility] April 20, 2021

## 2021-04-20 NOTE — ED NOTES
Pt has been awake and tangential.  Talking with multiple staff and is very animated.  Paranoia at times.  States she wants to leave after the trial and is concerned about being here too long.  Refused vitals at 0730.
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
## 2021-04-20 NOTE — PHARMACY-ADMISSION MEDICATION HISTORY
Please see Admission Medication History note completed on 4/19/21 under previous encounter at [facility] for information regarding prior to admission medications.    Nicollette McMann, PharmD  [facility] [place]  Emergency Department: Ascom *[phone]

## 2021-04-20 NOTE — ED NOTES
Pt  Oskar called inquiring about disposition. Reported that pt had slept some during the night and that this writer had spoken to her and she had been given medication during the night. Stated that Psychiatrist will be in around 8 to further assess.

## 2021-04-20 NOTE — ED NOTES
Called and spoke to pt  Oskar and updated to disposition of voluntary hospitalization and explained that finding a bed for inpatient can take several hours. Oskar stated concerns that he had not received a call from the Doctor on the unit. Explained that typically nursing or [facility] staff will communicate with families and relay information from the doctor and to the doctor at [facility] unit. Oskar noted understanding of explanation. Stated message will be left with next staff to contact Oskar when bed is assigned.

## 2021-04-20 NOTE — PROGRESS NOTES
Pt awake and paces unit. She has been dancing off and on. Talks with staff, hyper verbal.  She reports she can not read or write but uses pictures to communicate, and at the same time talks about being in school  for social work. She wants staff to be near her, much of her conversation is rambled and does not make sense.She is cooperative but hyperactive. Denies SI/HI. Denies hallucinations.

## 2021-04-20 NOTE — ED NOTES
Consulted with Dr. Manrique about continued presentation of manic behavior and communication for pt. Dr. Manrique spoke to pt to assess and pt agreed to voluntary inpatient placement. Called Central Intake to request inpatient bed.

## 2021-04-21 LAB
ALBUMIN SERPL-MCNC: 4.2 G/DL (ref 3.4–5)
ALP SERPL-CCNC: 59 U/L (ref 40–150)
ALT SERPL W P-5'-P-CCNC: 43 U/L (ref 0–50)
ANION GAP SERPL CALCULATED.3IONS-SCNC: 10 MMOL/L (ref 3–14)
AST SERPL W P-5'-P-CCNC: 22 U/L (ref 0–45)
BILIRUB SERPL-MCNC: 0.6 MG/DL (ref 0.2–1.3)
BUN SERPL-MCNC: 13 MG/DL (ref 7–30)
CALCIUM SERPL-MCNC: 8.7 MG/DL (ref 8.5–10.1)
CHLORIDE SERPL-SCNC: 106 MMOL/L (ref 94–109)
CHOLEST SERPL-MCNC: 124 MG/DL
CO2 SERPL-SCNC: 24 MMOL/L (ref 20–32)
CREAT SERPL-MCNC: 0.72 MG/DL (ref 0.52–1.04)
DEPRECATED CALCIDIOL+CALCIFEROL SERPL-MC: 30 UG/L (ref 20–75)
ERYTHROCYTE [DISTWIDTH] IN BLOOD BY AUTOMATED COUNT: 15.9 % (ref 10–15)
FOLATE SERPL-MCNC: 16.7 NG/ML
GFR SERPL CREATININE-BSD FRML MDRD: >90 ML/MIN/{1.73_M2}
GLUCOSE SERPL-MCNC: 88 MG/DL (ref 70–99)
HCT VFR BLD AUTO: 41.5 % (ref 35–47)
HDLC SERPL-MCNC: 65 MG/DL
HGB BLD-MCNC: 13.2 G/DL (ref 11.7–15.7)
LDLC SERPL CALC-MCNC: 49 MG/DL
MCH RBC QN AUTO: 27.3 PG (ref 26.5–33)
MCHC RBC AUTO-ENTMCNC: 31.8 G/DL (ref 31.5–36.5)
MCV RBC AUTO: 86 FL (ref 78–100)
NONHDLC SERPL-MCNC: 59 MG/DL
PLATELET # BLD AUTO: 293 10E9/L (ref 150–450)
POTASSIUM SERPL-SCNC: 3.4 MMOL/L (ref 3.4–5.3)
PROT SERPL-MCNC: 8.3 G/DL (ref 6.8–8.8)
RBC # BLD AUTO: 4.84 10E12/L (ref 3.8–5.2)
SODIUM SERPL-SCNC: 140 MMOL/L (ref 133–144)
TRIGL SERPL-MCNC: 50 MG/DL
VIT B12 SERPL-MCNC: 624 PG/ML (ref 193–986)
WBC # BLD AUTO: 6.6 10E9/L (ref 4–11)

## 2021-04-21 PROCEDURE — 82607 VITAMIN B-12: CPT | Performed by: STUDENT IN AN ORGANIZED HEALTH CARE EDUCATION/TRAINING PROGRAM

## 2021-04-21 PROCEDURE — 82306 VITAMIN D 25 HYDROXY: CPT | Performed by: STUDENT IN AN ORGANIZED HEALTH CARE EDUCATION/TRAINING PROGRAM

## 2021-04-21 PROCEDURE — 93005 ELECTROCARDIOGRAM TRACING: CPT

## 2021-04-21 PROCEDURE — 85027 COMPLETE CBC AUTOMATED: CPT | Performed by: STUDENT IN AN ORGANIZED HEALTH CARE EDUCATION/TRAINING PROGRAM

## 2021-04-21 PROCEDURE — 36415 COLL VENOUS BLD VENIPUNCTURE: CPT | Performed by: STUDENT IN AN ORGANIZED HEALTH CARE EDUCATION/TRAINING PROGRAM

## 2021-04-21 PROCEDURE — 80061 LIPID PANEL: CPT | Performed by: STUDENT IN AN ORGANIZED HEALTH CARE EDUCATION/TRAINING PROGRAM

## 2021-04-21 PROCEDURE — 80053 COMPREHEN METABOLIC PANEL: CPT | Performed by: STUDENT IN AN ORGANIZED HEALTH CARE EDUCATION/TRAINING PROGRAM

## 2021-04-21 PROCEDURE — 124N000002 HC R&B MH UMMC

## 2021-04-21 PROCEDURE — 93010 ELECTROCARDIOGRAM REPORT: CPT | Performed by: INTERNAL MEDICINE

## 2021-04-21 PROCEDURE — 82746 ASSAY OF FOLIC ACID SERUM: CPT | Performed by: STUDENT IN AN ORGANIZED HEALTH CARE EDUCATION/TRAINING PROGRAM

## 2021-04-21 PROCEDURE — 99222 1ST HOSP IP/OBS MODERATE 55: CPT | Mod: AI | Performed by: PSYCHIATRY & NEUROLOGY

## 2021-04-21 PROCEDURE — H2032 ACTIVITY THERAPY, PER 15 MIN: HCPCS

## 2021-04-21 PROCEDURE — 250N000013 HC RX MED GY IP 250 OP 250 PS 637: Performed by: STUDENT IN AN ORGANIZED HEALTH CARE EDUCATION/TRAINING PROGRAM

## 2021-04-21 PROCEDURE — 250N000013 HC RX MED GY IP 250 OP 250 PS 637: Performed by: PSYCHIATRY & NEUROLOGY

## 2021-04-21 RX ORDER — LORAZEPAM 1 MG/1
1 TABLET ORAL EVERY 4 HOURS PRN
Status: DISCONTINUED | OUTPATIENT
Start: 2021-04-21 | End: 2021-04-27 | Stop reason: HOSPADM

## 2021-04-21 RX ORDER — HALOPERIDOL 2 MG/1
2 TABLET ORAL EVERY 6 HOURS PRN
Status: DISCONTINUED | OUTPATIENT
Start: 2021-04-21 | End: 2021-04-27 | Stop reason: HOSPADM

## 2021-04-21 RX ORDER — OLANZAPINE 10 MG/2ML
10 INJECTION, POWDER, FOR SOLUTION INTRAMUSCULAR 3 TIMES DAILY PRN
Status: DISCONTINUED | OUTPATIENT
Start: 2021-04-21 | End: 2021-04-22

## 2021-04-21 RX ORDER — DIPHENHYDRAMINE HCL 25 MG
25 CAPSULE ORAL EVERY 6 HOURS PRN
Status: DISCONTINUED | OUTPATIENT
Start: 2021-04-21 | End: 2021-04-27 | Stop reason: HOSPADM

## 2021-04-21 RX ORDER — OLANZAPINE 20 MG/1
20 TABLET ORAL AT BEDTIME
Status: DISCONTINUED | OUTPATIENT
Start: 2021-04-21 | End: 2021-04-23

## 2021-04-21 RX ORDER — OLANZAPINE 10 MG/1
10 TABLET ORAL DAILY PRN
Status: DISCONTINUED | OUTPATIENT
Start: 2021-04-21 | End: 2021-04-22

## 2021-04-21 RX ADMIN — OLANZAPINE 10 MG: 10 TABLET, FILM COATED ORAL at 07:09

## 2021-04-21 RX ADMIN — OLANZAPINE 20 MG: 20 TABLET, FILM COATED ORAL at 20:09

## 2021-04-21 RX ADMIN — LORAZEPAM 1 MG: 1 TABLET ORAL at 12:40

## 2021-04-21 RX ADMIN — HALOPERIDOL 2 MG: 2 TABLET ORAL at 12:41

## 2021-04-21 RX ADMIN — DOCUSATE SODIUM 50 MG AND SENNOSIDES 8.6 MG 1 TABLET: 8.6; 5 TABLET, FILM COATED ORAL at 12:13

## 2021-04-21 RX ADMIN — DIPHENHYDRAMINE HYDROCHLORIDE 25 MG: 25 CAPSULE ORAL at 12:41

## 2021-04-21 RX ADMIN — DIPHENHYDRAMINE HYDROCHLORIDE 25 MG: 25 CAPSULE ORAL at 18:25

## 2021-04-21 RX ADMIN — LORAZEPAM 1 MG: 1 TABLET ORAL at 18:25

## 2021-04-21 RX ADMIN — HALOPERIDOL 2 MG: 2 TABLET ORAL at 18:26

## 2021-04-21 ASSESSMENT — ACTIVITIES OF DAILY LIVING (ADL)
ORAL_HYGIENE: INDEPENDENT
HYGIENE/GROOMING: INDEPENDENT
DRESS: INDEPENDENT
LAUNDRY: WITH SUPERVISION

## 2021-04-21 NOTE — CONSULTS
"SPIRITUAL HEALTH SERVICES   Spiritual Assessment Progress Note (Behavioral Health/CD Focus)  [facility] ([facility]) 22N    REFERRAL SOURCE: Patient    On this visit, I met patient in her room.     EXPERIENCE OF ILLNESS/HOSPITALIZATION:  Patient talked about her past trauma, and how she feels judged by people in her life. Patient also shared that she wants to connect to \"Mother Earth,\" that her children and her  are the most important parts of her life.  She also read several passages from the Bible that she felt were meaningful.     MEANING-MAKING:  Not discussed    SPIRITUALITY/VALUES/Scientologist:  Patient read several passages from the Bible that she said were meaningful, and said that she believes in \"us as one people\" and in \"Mother Earth.\"      COPING/SPIRITUAL PRACTICES: Patient draws, writes and colors, and she reads from her Bible.     SUPPORT SYSTEMS: Patient mentioned her  as well as her children.      PLAN: SH will continue to be available as needed/requested.                                                                                                                                   Missy Pierce    Pager: [phone]    "

## 2021-04-21 NOTE — PLAN OF CARE
BEHAVIORAL TEAM DISCUSSION    Participants:   Dr. Keegan Mathis, Attending Psychiatrist  Addi Main, PGY1  Brneda Winkler, RN   Shweta Yeung, LPCC, LADC, CTC  Progress: Initial  Anticipated length of stay: 5-7 days  Continued Stay Criteria/Rationale: Pamela  Medical/Physical: No acute issues - was cleared by ED for psychiatric admission  Precautions:   Behavioral Orders   Procedures     Code 1 - Restrict to Unit     Elopement precautions     Routine Programming     As clinically indicated     Single Room     Status 15     Every 15 minutes.     Plan: The plan is to assess and patient for mental health and medication needs.  The patient will be prescribed medications to treat the identified symptoms.  Upon discharge the patient will be referred to services as appropriate based on the assessment.   Rationale for change in precautions or plan: No change at present-will continue to monitor and adjust as needed.
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
## 2021-04-21 NOTE — PLAN OF CARE
Pt s  called states he is worried about his wife and would like for the doctors to call him tomorrow . He states she has smoked pot every night for 5 years after putting the kids to bed and then goes to bed at 9 pm . He says however the last few weeks she hasnt been smoking pot and that is when her mood changed to manic. He  states she has never been religous and now is reading the bible and talking abut being submissive to her . Pt tonight states she wants to have sex and is feeling anxious . Pt given zyprexa 10 mg prn which did not  and then at bedtime she had zyprexa , Pt now sleeping

## 2021-04-21 NOTE — PLAN OF CARE
Assessment/Intervention and Care Coordination  -Refer to psychosocial completed on 4/21/2021 for assessment/social functioning  -Chart review  -Pre round meeting with team  -Rounded with team, addressed patient needs/concerns  -Post round meeting with team  -Team note  -Initial psychosocial  -Personal Plan of care  -AVS    Discharge Plan or Goal  Pending stabilization & development of a safe discharge plan.  Considerations include: Return home with outpatient providers.    Barriers to Discharge and Current Symptoms  Patient requires further psychiatric stabilization due to current symptomology    Symptoms include the following: manic, disorganization, anxious and patient endorses poor sleep. Patient speech is rapid and tangential, appears confused and has a difficult time focusing on one subject. Patient is displaying Scientology preoccupation. Per  report, this is new, as patient historically has not been Scientology.    Referral Status  None at present. Considerations to include IOP, Therapy. Patient does have psychiatry in place.    Legal Status  Patient is voluntary
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
## 2021-04-21 NOTE — PLAN OF CARE
Problem: Behavioral Health Plan of Care  Goal: Patient-Specific Goal (Individualization)  Flowsheets (Taken 4/21/2021 2548)  Patient Vulnerabilities:   adverse childhood experience(s)   traumatic event   family/relationship conflict  Patient Personal Strengths:   independent living skills   positive attitude   socioeconomic stability   stable living environment   family/social support     Reasons for hospitalization:  -Trauma  -Issues with sleep    Goals:  -Improve sleep  -Feel safe  -Medication management

## 2021-04-21 NOTE — PROGRESS NOTES
"Pt participated in dance/movement therapy (DMT) with a high level of energy with a theme of organizing and focusing.  Leadership rotated among peers and creative expression predominated with strong direction and boundaries of both musical rhythm and therapist movement structures.  Movement qualities were strong and highly receptive to nonverbal interventions.    Pt had a high energy level and enjoyed leadership roles and creative expression.  She was labile and appreciated gentle therapeutic containment of her emotional expressions as well as her behaviors (at one point she tried to take some soil from one of the plants in the room and place it on the floor.)  All behaviors were redirectable with gentle suggestion.  Pt shared some painful memories about racist lack of acceptance in her life and used some movements to reinforce \"being here now\" to accept herself and receive acceptance from others in the group now as well.  Pt processed many emotions through physical movements throughout the session and expressed gratitude for some direction she hopes to take in couples counseling with her partner.         04/21/21 1115   Dance Movement Therapy   Type of Intervention structured groups   Response participates with cues/redirection   Hours 1     "

## 2021-04-21 NOTE — PLAN OF CARE
"  Problem: Sleep Disturbance  Goal: Adequate Sleep/Rest  Outcome: Improving   Night Shift Summary (4/20/21 into 04/21/21)    Pt in bed sleeping at start of shift, breathing quiet and unlabored. Pt appeared to have slept 7.25hrs thus far.    Pt continues on elopement precaution , with no related events occurring this shift. No prn meds administered during the night.    The routine 15 mins safety checks in progress. Will continue to monitor and assess.      0711 Pt crying loud in her room.Staff sitting at the desk could hear her.When asked what was wrong,she replied\" We are free from all of them.Can't you see\", while motioning with her hands to show writer the people that we were free from.She was nude but responded to redirection by putting her clothes back on. Prn Zyprexa given po.Pt is still sobbing. Will cont to monitor.  "

## 2021-04-22 LAB — INTERPRETATION ECG - MUSE: NORMAL

## 2021-04-22 PROCEDURE — 250N000013 HC RX MED GY IP 250 OP 250 PS 637: Performed by: PSYCHIATRY & NEUROLOGY

## 2021-04-22 PROCEDURE — 250N000013 HC RX MED GY IP 250 OP 250 PS 637: Performed by: STUDENT IN AN ORGANIZED HEALTH CARE EDUCATION/TRAINING PROGRAM

## 2021-04-22 PROCEDURE — 99231 SBSQ HOSP IP/OBS SF/LOW 25: CPT | Performed by: PSYCHIATRY & NEUROLOGY

## 2021-04-22 PROCEDURE — 124N000002 HC R&B MH UMMC

## 2021-04-22 RX ORDER — LORAZEPAM 1 MG/1
1 TABLET ORAL AT BEDTIME
Status: DISCONTINUED | OUTPATIENT
Start: 2021-04-22 | End: 2021-04-27 | Stop reason: HOSPADM

## 2021-04-22 RX ORDER — OLANZAPINE 10 MG/1
10 TABLET ORAL 2 TIMES DAILY PRN
Status: DISCONTINUED | OUTPATIENT
Start: 2021-04-22 | End: 2021-04-27 | Stop reason: HOSPADM

## 2021-04-22 RX ORDER — OLANZAPINE 10 MG/2ML
10 INJECTION, POWDER, FOR SOLUTION INTRAMUSCULAR 3 TIMES DAILY PRN
Status: DISCONTINUED | OUTPATIENT
Start: 2021-04-22 | End: 2021-04-27 | Stop reason: HOSPADM

## 2021-04-22 RX ADMIN — LORAZEPAM 1 MG: 1 TABLET ORAL at 11:20

## 2021-04-22 RX ADMIN — DIPHENHYDRAMINE HYDROCHLORIDE 25 MG: 25 CAPSULE ORAL at 11:20

## 2021-04-22 RX ADMIN — OLANZAPINE 20 MG: 20 TABLET, FILM COATED ORAL at 19:24

## 2021-04-22 RX ADMIN — HYDROXYZINE HYDROCHLORIDE 25 MG: 25 TABLET, FILM COATED ORAL at 03:22

## 2021-04-22 RX ADMIN — OLANZAPINE 10 MG: 10 TABLET, FILM COATED ORAL at 05:35

## 2021-04-22 RX ADMIN — HALOPERIDOL 2 MG: 2 TABLET ORAL at 11:18

## 2021-04-22 RX ADMIN — LORAZEPAM 1 MG: 1 TABLET ORAL at 21:24

## 2021-04-22 ASSESSMENT — ACTIVITIES OF DAILY LIVING (ADL)
LAUNDRY: WITH SUPERVISION
HYGIENE/GROOMING: INDEPENDENT
DRESS: INDEPENDENT
HYGIENE/GROOMING: INDEPENDENT
ORAL_HYGIENE: INDEPENDENT
ORAL_HYGIENE: INDEPENDENT
DRESS: INDEPENDENT
LAUNDRY: WITH SUPERVISION

## 2021-04-22 NOTE — PROGRESS NOTES
"CLINICAL NUTRITION SERVICES - ASSESSMENT NOTE     Nutrition Prescription    RECOMMENDATIONS FOR MDs/PROVIDERS TO ORDER:  None today    Malnutrition Status:    Unable to determine     Recommendations already ordered by Registered Dietitian (RD):  Continue prune juice with meals  Send bread on each meal tray    Future/Additional Recommendations:  If intakes decline, consider sending supplements or snacks     REASON FOR ASSESSMENT  Srinivas Noble is a/an 25 year old female assessed by the dietitian for a positive MST screen for weight loss and reduced intakes    NUTRITION HISTORY  - Per chart review, pts appetite and intakes declined ~1 week PTA.   - She reports eating 2-3 very small meals/day PTA. She enjoys Mexican food and prefers non greasy food as this causes stomach pain. She also avoids some vegetables d/t stomach pain - reporting this has been ongoing since she was 3 years old.     CURRENT NUTRITION ORDERS  Diet: Regular (no pork), prune juice TID with meals  Intake/Tolerance: pt reports eating greek yogurt, sausage, bagel with cream cheese, and fruit for breakfsat today    LABS  Labs reviewed  - Vitamin D 30 (WNL)    MEDICATIONS  Medications reviewed    ANTHROPOMETRICS  Ht Readings from Last 1 Encounters:   04/20/21 1.676 m (5' 6\")   Most Recent Weight: 68.7 kg (151 lb 6.4 oz)  IBW: 59.1 kg (116% IBW)  BMI: Normal BMI  Weight History: Weight appears fairly stable over the last week. She has lost 18 lbs (11%) over the last 2 months.   Wt Readings from Last 10 Encounters:   04/20/21 68.7 kg (151 lb 6.4 oz)   04/18/21 67.7 kg (149 lb 4.8 oz)   04/14/21 69.9 kg (154 lb)   04/12/21 70.1 kg (154 lb 8.7 oz)   04/08/21 02/11/21 06/05/20 74.8 kg (165 lb)  76.7 kg (169 lb) - CareEverywhere  78.7 kg (173 lb 9.6 oz) - CareEverywhere   07/17/14 84.4 kg (186 lb)     Dosing Weight: 69 kg - admit wt    ASSESSED NUTRITION NEEDS  Estimated Energy Needs: 9044-1632 kcals/day (25 - 30 kcals/kg)  Justification: " Maintenance  Estimated Protein Needs: 55-69 grams protein/day (0.8 - 1 grams of pro/kg)  Justification: Maintenance  Estimated Fluid Needs: 1 mL/kcal  Justification: Per provider pending fluid status    PHYSICAL FINDINGS  See malnutrition section below.    MALNUTRITION  % Intake: Decreased intake does not meet criteria  % Weight Loss: > 7.5% in 3 months (severe)  Subcutaneous Fat Loss: Unable to assess  Muscle Loss: Unable to assess  Fluid Accumulation/Edema: None noted  Malnutrition Diagnosis: Unable to determine     NUTRITION DIAGNOSIS  Predicted inadequate protein-energy intake related to variable appetite and intermittent stomach pain as evidenced by pt reliant on PO intakes to meet 100% of nutritional needs with potential for variation        INTERVENTIONS  Implementation  Discussed nutrition history and PO since admission. Discussed menu ordering and snacks available on the unit. Pt filled out their menu and are finding foods they enjoy and feels she will get enough to eat during this admission. She reports that she has been tolerating bland foods lately, especially breads. She would like to have this sent on each meal tray (bagel, toast, english muffin, etc). She would also like to continue prune juice TID. Denied any questions or concerns at this time.      Goals  Patient to consume % of nutritionally adequate meal trays TID, or the equivalent with supplements/snacks.     Monitoring/Evaluation  Progress toward goals will be monitored and evaluated per protocol.      Viridiana Chester RD, LD  5A/OB/Behavioral Health RD Pager: [phone]
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
## 2021-04-22 NOTE — PLAN OF CARE
"  Problem: Manic Behavior Episode  Goal: Decreased Manic Symptoms  Outcome: Improving   Srinivas continues to make freq delusional statements, often paranoid-periodically asks RN to come to her room to talk in private, then asks-\"What's going on?\"-does listen to and accept that she is having a manic episode and some of her thoughts are not based in reality-briefly reassured, but quickly returns to expressing delusional thoughts, at times crying and very distraught with paranoid thoughts harm will come to family members-at 1115 crying hysterically due to paranoid thoughts, increasing agitation pounding very hard to rid the demons-received Haldol 2 mg PO, Ativan 1 mg PO and Benadryl 25 mg PO with decrease in agitation and anxiety, although delusional thinking continued-Karlos is eating well-  "

## 2021-04-22 NOTE — PROGRESS NOTES
"    ----------------------------------------------------------------------------------------------------------  [facility], [place]   Psychiatric Progress Note  Hospital Day #2     Interim History:   The patient's care was discussed with the treatment team and chart notes were reviewed.    Sleep: 3.75 hours (04/22/21 0553)  Scheduled Medications: taken as prescribed - 20mg olanzapine  PRN Medications: olanzapine 10mg x1, (haldol 2 ativan 1 benadryl 25 )x2    Staff Report: attended dance group, was energetic and labile, but redirectable      Patient Interview: Patient was interviewed in her personal room, she was still tangential and in mild/moderate distress.  Complained of poor sleep and concern for her brother.  Perseverated on her parents and difficult childhood.  Discussed starting ativan, pt was concerned but was accepting for sleep and anxiety.  --------------------------------------------------        The risks, benefits, alternatives and side effects of any medication changes have been discussed and are understood by the patient and other caregivers.    Review of systems:     ROS was negative unless noted above.          Allergies:     Allergies   Allergen Reactions     Vitamin K      Iron Rash     IV iron            Psychiatric Examination:   /89   Pulse 107   Temp 98.7  F (37.1  C) (Oral)   Resp 16   Ht 1.676 m (5' 6\")   Wt 68.7 kg (151 lb 6.4 oz)   LMP 07/02/2014   SpO2 100%   BMI 24.44 kg/m    Weight is 151 lbs 6.4 oz  Body mass index is 24.44 kg/m .    Appearance: initially sleeping but wakes easily to voice, adequately groomed, dressed in hospital scrubs and appeared as age stated  Attitude:  cooperative but cautious  Eye Contact:  good  Mood:  tired  Affect:  intensity is heightened, labile, ranging from dysthyic and crying to elevated, full range and reactive  Speech:  clear, coherent, pressured speech, rambling and overinclusive  Psychomotor Behavior:  no " "evidence of tardive dyskinesia, dystonia, or tics, physical agitation and walking around unit margarito [place]  Thought Process:  disorganized, illogical and tangental; flight of ideas  Associations:  loosening of associations present  Thought Content:  Catholic preoccupation, grandiosity, concerned about race/ individuals, difficult to assess otherwise due to level of disorganization  Insight:  poor - due to level of disorganization  Judgment:  poor - due to level of disorganization  Oriented to:  grossly oriented  Attention Span and Concentration:  limited - highly distractable  Recent and Remote Memory:  intact  Language: Fluent English with appropriate syntax and vocabulary;  accent  Fund of Knowledge: appropriate  Muscle Strength and Tone: appears normal with no visible atrophy  Gait and Station: normal gait, intact station         Labs:   No results found for this or any previous visit (from the past 24 hour(s)).         Assessment    Principal Diagnosis:   # Bipolar disorder type 1  # rule out schizoaffective disorder bipolar type  # rule out substance-induced mood disorder    Secondary psychiatric diagnoses of concern this admission:   # PTSD    Diagnostic Impression:   This patient is a 25 year old female with history of PTSD, depression, and anxiety who presented to [facility] on 04/19/2021 and was subsequently transferred to [facility] due to manic symptoms and disorganized behavior. This is in the context of two weeks of worsening decompensation to psychosis and trevor-like symptoms. On admission, she displayed an elevated mood (\"chosen\") with congruent affect. She reported additional symptoms of panic, guilt, worry about family, and recent VH with ideas of reference on the TV. Substance use is likely playing a role in her presentation, as Ms. Noble uses cannabis daily with +UDS.  Family history is notable for depression, substance use, and unknown mental illness. MSE was notable for " elevated mood, pressured speech, flight of ideas, Worship preoccupation, grandiose delusions of being chosen by God, and high distractability. Overall, this presentation is consistent with a manic episode. Given Ms. Noble's history of depression Bipolar Disorder type 1 is the most likely diagnosis. Also on the differential would be schizoaffective disorder bipolar type, less likely without a history of psychosis, and substance-induced mood disorder, given cannabis use.      Hospital course: Srinivas Noble was admitted to station 22 as a voluntary patient. Olanzapine was continued from OSH and up titrated to 20mg at bedtime. Benadryl 25/haldol 2/ativan 1 was added as a PRN medication due to patient maxing out olanzapine daily.     Discontinued Medications (& Rationale):    Medical course: Patient was medically cleared prior to admission and no medical issues have come up.    Plan   Today's changes:  - Start ativan 1mg hs for sleep  - 10mg PRN Olanzapine available twice daily, due to 40mg max    Psychotropic Medications:  Scheduled Meds:    LORazepam  1 mg Oral At Bedtime     OLANZapine  20 mg Oral At Bedtime       PRN Meds:  -PO Haldol 2mg + Ativan 1mg + Benadryl 25mg q6H for severe anxiety, aggression, agitation  -Hydroxyzine 25 mg every 4 hours, for anxiety  -Melatonin 3 mg nightly, for sleep  -Olanzapine 10 mg p.o./IM twice daily, for emergency use      Patient will be treated in therapeutic milieu with appropriate individual and group therapies as described.      Medical diagnoses to be addressed this admission:    #.  None    Data: See above  Consults: None    Legal Status: Voluntary    Safety Assessment:   Behavioral Orders   Procedures     Code 1 - Restrict to Unit     Elopement precautions     Routine Programming     As clinically indicated     Single Room     Status 15     Every 15 minutes.       Disposition: 10-14 days; Pending stabilization & development of a safe discharge plan.    Keegan Mathis M.D.,  Ph.D.     Dept. of Psychiatry   [facility]

## 2021-04-23 PROCEDURE — G0177 OPPS/PHP; TRAIN & EDUC SERV: HCPCS

## 2021-04-23 PROCEDURE — 250N000013 HC RX MED GY IP 250 OP 250 PS 637: Performed by: STUDENT IN AN ORGANIZED HEALTH CARE EDUCATION/TRAINING PROGRAM

## 2021-04-23 PROCEDURE — 99232 SBSQ HOSP IP/OBS MODERATE 35: CPT | Mod: GC | Performed by: PSYCHIATRY & NEUROLOGY

## 2021-04-23 PROCEDURE — 124N000002 HC R&B MH UMMC

## 2021-04-23 PROCEDURE — 250N000013 HC RX MED GY IP 250 OP 250 PS 637: Performed by: PSYCHIATRY & NEUROLOGY

## 2021-04-23 RX ADMIN — HALOPERIDOL 2 MG: 2 TABLET ORAL at 12:07

## 2021-04-23 RX ADMIN — HYDROXYZINE HYDROCHLORIDE 25 MG: 25 TABLET, FILM COATED ORAL at 05:22

## 2021-04-23 RX ADMIN — OLANZAPINE 25 MG: 5 TABLET, FILM COATED ORAL at 20:09

## 2021-04-23 RX ADMIN — DIPHENHYDRAMINE HYDROCHLORIDE 25 MG: 25 CAPSULE ORAL at 12:06

## 2021-04-23 RX ADMIN — LORAZEPAM 1 MG: 1 TABLET ORAL at 20:09

## 2021-04-23 RX ADMIN — LORAZEPAM 1 MG: 1 TABLET ORAL at 12:06

## 2021-04-23 RX ADMIN — ACETAMINOPHEN 650 MG: 325 TABLET, FILM COATED ORAL at 16:13

## 2021-04-23 RX ADMIN — LORAZEPAM 1 MG: 1 TABLET ORAL at 20:13

## 2021-04-23 RX ADMIN — HYDROXYZINE HYDROCHLORIDE 25 MG: 25 TABLET, FILM COATED ORAL at 20:09

## 2021-04-23 ASSESSMENT — ACTIVITIES OF DAILY LIVING (ADL)
HYGIENE/GROOMING: INDEPENDENT
ORAL_HYGIENE: INDEPENDENT
DRESS: SCRUBS (BEHAVIORAL HEALTH);INDEPENDENT
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
## 2021-04-23 NOTE — PROGRESS NOTES
Behavioral Health  Note   Behavioral Health  Spirituality Group Note     Unit 22    Name: Srinivas Noble    YOB: 1995   MRN: [id number]    Age: 25 year old     Patient attended -led group, which included discussion of spirituality, coping with illness and building resilience.   Patient attended group for 1.0 hrs.   patient minimally participated, but was respectful to the group process.     [facility]  Staff    Page [phone]

## 2021-04-23 NOTE — PROGRESS NOTES
"    ----------------------------------------------------------------------------------------------------------  [facility], [place]   Psychiatric Progress Note  Hospital Day #3     Interim History:   The patient's care was discussed with the treatment team and chart notes were reviewed.    Sleep: 5.5 hours (04/23/21 0600)  Scheduled Medications: taken as prescribed - 20mg olanzapine, 1mg lorazepam at bedtime   PRN Medications: (haldol 2 ativan 1 benadryl 25 )x1, hydroxyzine    Staff Report: Srinivas continued to be disorganized and make freq delusional statements, often paranoid. Attended and engaged in psychotherapy group      Patient Interview: Patient was interviewed in her personal room. Today, she reports feeling a bit \"better\" today, and that the ativan was very beneficial to her sleep. She reports feeling a little \"nervous\" on the unit and thinks she is bothering other patients. Srinivas went into detail about Arabic mythology and her  resembling [employer]. Also referenced Bahai. Asks, \"is society blaming us for Yusef Manzo?\"    The risks, benefits, alternatives and side effects of any medication changes have been discussed and are understood by the patient and other caregivers.    Review of systems:     ROS was negative unless noted above.          Allergies:     Allergies   Allergen Reactions     Vitamin K      Iron Rash     IV iron            Psychiatric Examination:   /72   Pulse 101   Temp 99.4  F (37.4  C) (Oral)   Resp 16   Ht 1.676 m (5' 6\")   Wt 68.7 kg (151 lb 6.4 oz)   LMP 07/02/2014   SpO2 100%   BMI 24.44 kg/m    Weight is 151 lbs 6.4 oz  Body mass index is 24.44 kg/m .    Appearance: encountered in OT group and returns to personal room for interview adequately groomed, dressed in hospital scrubs and appeared as age stated  Attitude:  cooperative  Eye Contact:  good  Mood:  \"better\"  Affect: Elevated,  intensity is heightened, full range and " "reactive  Speech:  clear, coherent, pressured speech, rambling and overinclusive; rapid  Psychomotor Behavior:  no evidence of tardive dyskinesia, dystonia, or tics and decreased physical agitation  Thought Process:  disorganized, illogical and tangental; flight of ideas  Associations:  loosening of associations present  Thought Content:  Synagogue preoccupation, grandiosity, concerned about race, difficult to assess otherwise due to level of disorganization  Insight:  poor - due to level of disorganization  Judgment:  poor - due to level of disorganization  Oriented to:  grossly oriented, not to day  Attention Span and Concentration:  limited - highly distractable  Recent and Remote Memory:  intact  Language: Fluent English with appropriate syntax and vocabulary;  accent  Fund of Knowledge: appropriate  Muscle Strength and Tone: appears normal with no visible atrophy  Gait and Station: normal gait, intact station         Labs:   No results found for this or any previous visit (from the past 24 hour(s)).         Assessment    Principal Diagnosis:   # Bipolar disorder type 1  # rule out schizoaffective disorder bipolar type  # rule out substance-induced mood disorder    Secondary psychiatric diagnoses of concern this admission:   # PTSD    Diagnostic Impression:   This patient is a 25 year old female with history of PTSD, depression, and anxiety who presented to [facility] on 04/19/2021 and was subsequently transferred to [facility] due to manic symptoms and disorganized behavior. This is in the context of two weeks of worsening decompensation to psychosis and trevor-like symptoms. On admission, she displayed an elevated mood (\"chosen\") with congruent affect. She reported additional symptoms of panic, guilt, worry about family, and recent VH with ideas of reference on the TV. Substance use is likely playing a role in her presentation, as Ms. Noble uses cannabis daily with +UDS.  Family history is notable " for depression, substance use, and unknown mental illness. MSE was notable for elevated mood, pressured speech, flight of ideas, Advent preoccupation, grandiose delusions of being chosen by God, and high distractability. Overall, this presentation is consistent with a manic episode. Given Ms. Noble's history of depression Bipolar Disorder type 1 is the most likely diagnosis. Also on the differential would be schizoaffective disorder bipolar type, less likely without a history of psychosis, and substance-induced mood disorder, given cannabis use.      Hospital course: Srinivas Noble was admitted to station 22 as a voluntary patient. Olanzapine was continued from OSH and up titrated to 25mg at bedtime. Benadryl 25/haldol 2/ativan 1 was added as a PRN medication due to patient maxing out olanzapine daily.     Discontinued Medications (& Rationale):    Medical course: Patient was medically cleared prior to admission and no medical issues have come up.    Plan   Today's changes:  - Increase Olanzapine to 25mg at bedtime   - Continue ativan 1mg hs for sleep  - 10mg PRN Olanzapine available twice daily, due to 40mg max    Psychotropic Medications:  Scheduled Meds:    LORazepam  1 mg Oral At Bedtime     OLANZapine  25 mg Oral At Bedtime       PRN Meds:  -PO Haldol 2mg/Ativan 1mg/Benadryl 25mg q6H for severe anxiety, aggression, agitation, psychosis  -Hydroxyzine 25 mg every 4 hours, for anxiety  -Melatonin 3 mg nightly, for sleep  -Olanzapine 10 mg p.o./IM twice daily, for emergency use      Patient will be treated in therapeutic milieu with appropriate individual and group therapies as described.      Medical diagnoses to be addressed this admission:    #.  None    Data: See above  Consults: None    Legal Status: Voluntary    Safety Assessment:   Behavioral Orders   Procedures     Code 1 - Restrict to Unit     Elopement precautions     Routine Programming     As clinically indicated     Single Room     Status 15      Every 15 minutes.       Disposition: 10-14 days; Pending stabilization & development of a safe discharge plan.    Patient was seen and discussed with my attending physician, Dr. Keegan Mathis who agrees with the assessment and plan.    Addi Main MD, PhD  PGY-1 Psychiatry Resident    Attestation:  This patient has been seen and evaluated by me, Keegan Mathis MD.  I have discussed this patient with the house staff team including the resident and medical student and I agree with the findings and plan in this note.    I have reviewed today's vital signs, medications, labs and imaging. Keegan Mathis MD , PhD.

## 2021-04-23 NOTE — PROGRESS NOTES
"Pt presents with delusional and disorganized thinking patterns. Pt approached this writer in the [facility], showed a stack of playing cards and asked \"Do you know what this means?\" Writer replied and said no they didn't understand. Pt went on to explain that these cards were speaking to her telling her that \"equality is peace\".     Later on, pt approached this writer, presenting with agitation and spoke only in her native language of Korean. Pt stated to this writer that writer is a demonic presence/energy and that writer needs to leave her family alone. Pt was not receptive to redirection, stating \"Do you understand? Yes or no. Do you understand?\" Pt presented with rapid, pressured speech and raised tone of voice. Writer will continue to monitor.   "

## 2021-04-23 NOTE — PLAN OF CARE
Problem: Manic Behavior Episode  Goal: Decreased Manic Symptoms  4/22/2021 1959 by Brenda Winkler, RN  Outcome: Improving  Kezya expressing fear this evening that one particular staff member is a demon-walking about unit holding books over chest and pounding loudly with fist-states she beating the demon away-continues tangential speech-freq talking about various family members with disorganized stories-asked staff to come into her room where she went page by page in [employer] explaining how each page related to her family, e.g. explained picture of Peterson Garcia was picture of her grandfather who had donated a lot of money to [facility]-

## 2021-04-23 NOTE — PLAN OF CARE
Problem: Sleep Disturbance  Goal: Adequate Sleep/Rest  Outcome: Improving   Night Shift Summary (4/22/21 into 04/23/21)    Pt in bed sleeping at start of shift,breathing quiet and unlabored. Pt has been awake since 0445.She is restless going in/out of her room,crying on and off,and expressing that she is carrying a lot of guilt.She sang loud from her room while tapping on the Bible that she was hugging and carrying across her chest .Unable to redirect. Prn vistaril given at 0522 with little relief.Will give prn Zyprexa if things don't improve.She slept for 5.5 hrs.     Pt continues on elopement precaution, with no related events occurring this shift.     Will continue to monitor and assess. The 15 mins safety checks in progress.

## 2021-04-23 NOTE — PLAN OF CARE
[facility]  Occupational Therapy Behavioral Health Assessment    Patient Name: Srinivas Noble    Description: OT staff met with pt to review the role of occupational therapy and explain the value of having them involved in their treatment plan including options to meet current needs/self-identified goals. The below evaluation is a compilation of functional performance observation and information obtained from an OT self-assessment.       Patient indicated success in: (Bolded items indicate activities the pt selected)   Time spent with family or friends  Relaxing and enjoying myself  Having a satisfying routine  Work or volunteering   Concentrating on my tasks     Living independently  Taking care of the place where I live  Transportation  Managing my finances  Managing my basic needs (food, medicine, sleep)  Learning new things  Ability to pursue my goals  Other:    Patient indicated barriers to success are: (Bolded items indicate activities the pt selected)   Difficulty concentrating  Memory problems  Physical health/Chronic Pain  Lacks support  Motivation/mood  Finances  Using drugs or alcohol  Sleeping too much or not enough  Missing work/appointments  Bothered by lights or sounds in the environment  Bothered by touch, texture, or movement  Lack of satisfying daily routine   Living environment  Transportation  Other:          Patient indicated these supports: (Bolded items indicate activities the pt selected)   Safe place to live  Family, friends or caregivers to help out when needed  A best friend or significant other  Pets  Belief in myself and abilities  Routines and rituals that support my wellness  Access to email, social media, phone calls  Leisure supplies to support my interests and hobbies  Professionals: , Therapist, etc.  Other:      Patient identified these emotional, physical or mental health concerns: Anxiety     Patient florian with these concerns: Walking    Patient enjoys spending  "time with:  and children.     Identified enjoyment in activities: \"swimming, dancing.\"    Stressors or changes in the past year: \"[place] in [place]    Patient identified values: (Not answered)    Patient's goal for the future is to get her Master's in education.     Goals selected by patient to work on with OT: (Bolded items indicate activities the pt selected)   Have hope for the future  Feel better  Learn ways to stay well and avoid coming to the hospital  Share my thoughts and feelings  Feel more confident  Improve my sleep  Improve my concentration  Relax and enjoy myself  Improve my relationships with others  Handle my frustrations  Develop a satisfying daily routine  Manage my physical pain  Explore use of sensory coping strategies  Other:     Assessment: Patient would benefit from continue engagement in OT groups that support healthy recovery, specifically exploration of positive coping skills and symptom management/relapse prevention.      Plan: Initiate care plan goals and interventions.    Patient participated in goal(s) selection and understands the plan of care: Yes      Plan for Next Treatment: Provide graded occupation-based activities for increased success and ongoing assessment.     Rhoda Cisneros, OT on 4/23/2021 at 1:20 PM      Daily Note:     Pt attended 3 of 3 OT groups today. Pt attended AM OT group. Declined to participate in structured activity. Pt was quite tangential, covering topics such as how it's because of her parent's that she's here, her current work, her time growing up in [place], and how she thinks that someone is going to burn down her home. Pt reports that she feels she is having a \"constant panic attack\" and that marijuana is the only thing that helps with this. With prompting, pt ID'd dancing and listening to music as alternate coping skills. Additionally, pt participated in OT clinic with [facility], where she initiated a chosen project (making key chains for family members), " followed through with plan, and asked for support with supplies as needed. Selected several songs online to share with the group. Pt was social with peers throughout group.

## 2021-04-23 NOTE — PLAN OF CARE
Assessment/Intervention and Care Coordination  -Refer to psychosocial completed on 4/21/2021 for assessment/social functioning  -Chart review  -Pre round meeting with team  -Rounded with team, addressed patient needs/concerns  -Post round meeting with team  -Spoke with , provided update.  concerned that patient is not improving. Psychoeducation provided.   -[facility] paperwork provided to MD    Discharge Plan or Goal  Pending stabilization & development of a safe discharge plan.  Considerations include: Return home with outpatient providers.    Barriers to Discharge and Current Symptoms  Patient requires further psychiatric stabilization due to current symptomology    Symptoms include the following: manic, disorganization, anxious and patient endorses poor sleep. Patient speech is rapid and tangential, appears confused and has a difficult time focusing on one subject.   Referral Status  None at present. Considerations to include IOP, Therapy. Patient does have psychiatry in place.    Legal Status  Patient is voluntary

## 2021-04-24 PROCEDURE — 250N000013 HC RX MED GY IP 250 OP 250 PS 637: Performed by: PSYCHIATRY & NEUROLOGY

## 2021-04-24 PROCEDURE — 250N000013 HC RX MED GY IP 250 OP 250 PS 637: Performed by: STUDENT IN AN ORGANIZED HEALTH CARE EDUCATION/TRAINING PROGRAM

## 2021-04-24 PROCEDURE — 124N000002 HC R&B MH UMMC

## 2021-04-24 RX ADMIN — DIPHENHYDRAMINE HYDROCHLORIDE 25 MG: 25 CAPSULE ORAL at 09:15

## 2021-04-24 RX ADMIN — LORAZEPAM 1 MG: 1 TABLET ORAL at 19:55

## 2021-04-24 RX ADMIN — OLANZAPINE 25 MG: 5 TABLET, FILM COATED ORAL at 19:55

## 2021-04-24 RX ADMIN — HYDROXYZINE HYDROCHLORIDE 25 MG: 25 TABLET, FILM COATED ORAL at 22:53

## 2021-04-24 RX ADMIN — LORAZEPAM 1 MG: 1 TABLET ORAL at 09:15

## 2021-04-24 RX ADMIN — HYDROXYZINE HYDROCHLORIDE 25 MG: 25 TABLET, FILM COATED ORAL at 18:18

## 2021-04-24 RX ADMIN — ACETAMINOPHEN 650 MG: 325 TABLET, FILM COATED ORAL at 18:18

## 2021-04-24 RX ADMIN — HYDROXYZINE HYDROCHLORIDE 25 MG: 25 TABLET, FILM COATED ORAL at 14:01

## 2021-04-24 RX ADMIN — DIPHENHYDRAMINE HYDROCHLORIDE 25 MG: 25 CAPSULE ORAL at 15:43

## 2021-04-24 RX ADMIN — HALOPERIDOL 2 MG: 2 TABLET ORAL at 15:43

## 2021-04-24 RX ADMIN — HALOPERIDOL 2 MG: 2 TABLET ORAL at 09:15

## 2021-04-24 RX ADMIN — MELATONIN TAB 3 MG 3 MG: 3 TAB at 22:53

## 2021-04-24 RX ADMIN — LORAZEPAM 1 MG: 1 TABLET ORAL at 15:43

## 2021-04-24 RX ADMIN — LORAZEPAM 1 MG: 1 TABLET ORAL at 01:15

## 2021-04-24 ASSESSMENT — ACTIVITIES OF DAILY LIVING (ADL)
DRESS: INDEPENDENT
HYGIENE/GROOMING: INDEPENDENT
LAUNDRY: WITH SUPERVISION
ORAL_HYGIENE: INDEPENDENT

## 2021-04-24 NOTE — PLAN OF CARE
Problem: Sleep Disturbance  Goal: Adequate Sleep/Rest  Outcome: Improving   Night Shift Summary (4/23/21 into 04/24/21)    Pt in bed sleeping at start of shift,breathing quiet and unlabored . Pt woke up at 0100 pacing in/out of her room .She was restless and unable to calm down to go back to sleep.She complained of fast  heart beat fast by tapping on her chest like she usually does./.76 and HR 97 with RA sats at 100%. Ativan given po.Lavendar patch applied in addition to the patch.Pt back in bed. She appeared to have slept for 5.75hrs thus far this night.    Pt continues on elopement precaution, with no related events occurring this shift.     Will continue to monitor and assess. The 15 mins safety checks in progress.
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
## 2021-04-24 NOTE — PROGRESS NOTES
"Brief Cross Cover Note:    S: Patient signed 12 hour intent to discharge. Met with patient over [employer]. Reports it \"all started with the foster daughter.\" Patient was hyperverbal, discussing a  and others stating \"she was having sex with the owner and then nobody believed us.\" When trying to redirect patient to her decision for discharge, reports \"I can't sleep. I'm too anxious. It's too noisy.\" Patient states \"I don't know what happened with the Yusef Jovany trial. I don't want my face everywhere, like on the news and papers.\" When asked why her face would be in the news, patient states \"Because the police were all there at the [employer].\" Patient reports her  doesn't understand and reports she feels she is acting like herself. States \"he knew me as someone else for 4 years.\" Patient reports she has done nothing wrong and she doesn't belong in the hospital. Throughout interview, patient asked multiple times regarding the Yusef Jovany trial. Says she doesn't want to be in trouble and have everyone look at her. She denied SI/HI/AH/VH. States if she can't go home she would have her  come to the hospital and take her place instead. Did not have a clear safety plan for discharge.    Collateral from Oskar Noble, patient's :  States patient continues to be manic and paranoid. Has made delusional comments regarding the Yusef Jovnay trial. States he wants her home when she is better, however at this time he does not believe she is safe to discharge home. States she is not herself and he is worried.    O:  /72   Pulse 101   Temp 99.2  F (37.3  C) (Tympanic)   Resp 16   Ht 1.676 m (5' 6\")   Wt 68.7 kg (151 lb 6.4 oz)   LMP 07/02/2014   SpO2 100%   BMI 24.44 kg/m      Patient appears tired, with reddish hair in hospital scrubs. Sitting in interview room. Was cooperative, however guarded at times when discussing symptoms. Good eye contact. Described mood as \"anxious.\" " Affect was mood congruent, reactive. Speech was clear, coherent - however rapid at times, rambling and overinclusive. No psychomotor agitation noted. Thought process illogical and circumstantial. Delusions and paranoia present. Denied SI/HI/AH/VH. Poor insight into mental health concerns, fair judgement. Grossly oriented. Did not formally assess strength, tone, or gait.     A/P:  Patient appears manic with paranoid delusions. Has little to no insight into why she is in the hospital and about her manic symptoms. Her plan to stay safe is to go home and stay in a different room from her  - but cannot list alternative discharge options. Discussed with patient's , who is at home with their 3 children. Reports that patient is far from baseline. He does not believe she is safe to discharge home at this time.   - Placed patient on 72 HH  - Ordered soft care mattress to aid with sleep  - Nursing to offer headphones and sound machine    Sadiq Ray DO, MPH  PGY-2 Psychiatry Resident

## 2021-04-24 NOTE — PLAN OF CARE
"   Patient signed 12 Hour Intent to Leave, resulting in 72 Hour Hold.   Pt tangential, saying \"I don't know why they're blaming me.\" , referring to Yusef Manzo's death. Pt. Told staff that she was worried that 'they would recognize me on the street.'.  Pt also said she could not sleep due to yelling and screaming on unit.  Pt denies any SI/HI.  Pt blames  for preventing her from leaving hospital.  "

## 2021-04-24 NOTE — PLAN OF CARE
"  Problem: Manic Behavior Episode  Goal: Decreased Manic Symptoms  Outcome: Improving   Srinivas this AM anxious and agitated-pressured, rambling speech-expressing great concern that a Mormon man and a woman came to her room and left papers with intent to communicate to her that she is meant to bring all people together in harmony-holding hand to mouth as though holding a microphone stating she is to go all over the world speaking to people to bring everyone together-has misc coloring sheets and drawings in her room which she will explain significance of lines and shapes as messages that are being given to her-Srinivas requested PRN medication stating she feels very anxious and feels \"like my body won't let me sleep\"-0915 received Haldol 2 mg PO, Ativan 1 mg PO and Benadryl 25 mg PO-visibly calmer post receiving PRN-able to sleep 45 min-does remain very emotional and delusional, but responsive to reassurance and support-1405 Srinivas received hydroxyzine 25 mg PO at 1405 for c/o anxiety related to concerns her brother, who she hasn't seen in years because he is in snf, is coming to hospital to see her   "

## 2021-04-25 PROCEDURE — 250N000013 HC RX MED GY IP 250 OP 250 PS 637: Performed by: PSYCHIATRY & NEUROLOGY

## 2021-04-25 PROCEDURE — 250N000013 HC RX MED GY IP 250 OP 250 PS 637: Performed by: STUDENT IN AN ORGANIZED HEALTH CARE EDUCATION/TRAINING PROGRAM

## 2021-04-25 PROCEDURE — H2032 ACTIVITY THERAPY, PER 15 MIN: HCPCS

## 2021-04-25 PROCEDURE — 124N000002 HC R&B MH UMMC

## 2021-04-25 PROCEDURE — G0177 OPPS/PHP; TRAIN & EDUC SERV: HCPCS

## 2021-04-25 RX ADMIN — HYDROXYZINE HYDROCHLORIDE 25 MG: 25 TABLET, FILM COATED ORAL at 13:54

## 2021-04-25 RX ADMIN — HYDROXYZINE HYDROCHLORIDE 25 MG: 25 TABLET, FILM COATED ORAL at 09:50

## 2021-04-25 RX ADMIN — LORAZEPAM 1 MG: 1 TABLET ORAL at 12:11

## 2021-04-25 RX ADMIN — OLANZAPINE 25 MG: 5 TABLET, FILM COATED ORAL at 19:46

## 2021-04-25 RX ADMIN — LORAZEPAM 1 MG: 1 TABLET ORAL at 19:47

## 2021-04-25 ASSESSMENT — ACTIVITIES OF DAILY LIVING (ADL)
HYGIENE/GROOMING: INDEPENDENT
ORAL_HYGIENE: INDEPENDENT
DRESS: INDEPENDENT
LAUNDRY: WITH SUPERVISION

## 2021-04-25 ASSESSMENT — MIFFLIN-ST. JEOR: SCORE: 1450.29

## 2021-04-25 NOTE — PLAN OF CARE
Problem: Sleep Disturbance  Goal: Adequate Sleep/Rest  Outcome: Improving   Night Shift Summary (4/24/21 into 04/25/21)    Pt in bed sleeping at start of shift, breathing quiet and unlabored. Pt appeared to have slept for 6.5 hrs thus far.No prn meds given.She woke up once .    Pt continues on elopement precaution, with no related events occurring this shift.     Will continue to monitor and assess. The 15 mins safety checks cont.
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
## 2021-04-25 NOTE — PLAN OF CARE
"Music Therapy Group note    Total time in session: 45 minutes    Number of patients in group: 5    Scope of service: holistic    Patient progress: initial encounter    Patient response/reaction to treatment intervention(s): Srinivas had lots of \"special requests\" during group time tonight (having papers copied, more sugar for her tea, wanting the locked cabinets opened, etc.)   MT did not open cabinets for pt, and she was accepting of this.  She resonated with music with a moderate tempo, and was in and out of group a lot.  Social with peers, appearing somewhat disinhibited but not fully.   Redirectable, but tends to get \"lost\" in what she is talking about or doing and need cues.     Valerie Jerez, MT-BC  Board-Certified Music Therapist           "
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
## 2021-04-26 PROCEDURE — 99233 SBSQ HOSP IP/OBS HIGH 50: CPT | Mod: GC | Performed by: PSYCHIATRY & NEUROLOGY

## 2021-04-26 PROCEDURE — 250N000013 HC RX MED GY IP 250 OP 250 PS 637: Performed by: STUDENT IN AN ORGANIZED HEALTH CARE EDUCATION/TRAINING PROGRAM

## 2021-04-26 PROCEDURE — 124N000002 HC R&B MH UMMC

## 2021-04-26 PROCEDURE — 250N000013 HC RX MED GY IP 250 OP 250 PS 637: Performed by: PSYCHIATRY & NEUROLOGY

## 2021-04-26 RX ADMIN — MELATONIN TAB 3 MG 3 MG: 3 TAB at 02:41

## 2021-04-26 RX ADMIN — HYDROXYZINE HYDROCHLORIDE 25 MG: 25 TABLET, FILM COATED ORAL at 21:16

## 2021-04-26 RX ADMIN — HYDROXYZINE HYDROCHLORIDE 25 MG: 25 TABLET, FILM COATED ORAL at 10:00

## 2021-04-26 RX ADMIN — DIPHENHYDRAMINE HYDROCHLORIDE 25 MG: 25 CAPSULE ORAL at 23:37

## 2021-04-26 RX ADMIN — ACETAMINOPHEN 650 MG: 325 TABLET, FILM COATED ORAL at 13:00

## 2021-04-26 RX ADMIN — OLANZAPINE 10 MG: 10 TABLET, FILM COATED ORAL at 02:42

## 2021-04-26 RX ADMIN — LORAZEPAM 1 MG: 1 TABLET ORAL at 20:18

## 2021-04-26 RX ADMIN — MELATONIN TAB 3 MG 3 MG: 3 TAB at 21:17

## 2021-04-26 RX ADMIN — ACETAMINOPHEN 650 MG: 325 TABLET, FILM COATED ORAL at 17:42

## 2021-04-26 RX ADMIN — ACETAMINOPHEN 650 MG: 325 TABLET, FILM COATED ORAL at 06:05

## 2021-04-26 RX ADMIN — OLANZAPINE 25 MG: 5 TABLET, FILM COATED ORAL at 20:18

## 2021-04-26 ASSESSMENT — ACTIVITIES OF DAILY LIVING (ADL)
HYGIENE/GROOMING: INDEPENDENT
ORAL_HYGIENE: INDEPENDENT
LAUNDRY: WITH SUPERVISION
DRESS: INDEPENDENT

## 2021-04-26 NOTE — PLAN OF CARE
"  Problem: Manic Behavior Episode  Goal: Decreased Manic Symptoms  Outcome: Improving  Visible in the milieu, requested for prn tylenol for R foot pain. Pt also asked for a hot pack. States pain is sciatic pain and sometimes goes up to her thigh. Pt says PT used to help but she stopped it when she came here.  Mood is calm, affect flat/blunted but patient appears much improved since writer worked 3 days ago AEB not crying or loudly talking to self when in room. Denies SI/SIB/AVH. Asked writer, \"do you think I am a bad person?\" States her  is not nice to her. Ate dinner, med compliant and denies medications side effects. No other issues. Patient requested for anxiety and sleep med, given Hydroxyzine and Melatonin. Requested for Benadryl for itchy nose.   "

## 2021-04-26 NOTE — PLAN OF CARE
Pt attended 1 of 3 OT groups today. Pt participated in OT clinic with MIN redirection 2/2 reports of difficulty sitting still and concentrating, where she initiated a chosen project (beading a bracelet), followed through with plan, and asked for support with supplies as needed. Selected several songs online to share with the group.

## 2021-04-26 NOTE — PLAN OF CARE
"Problem: Sleep Disturbance  Goal: Adequate Sleep/Rest  Intervention: Promote Sleep/Rest  Flowsheets (Taken 4/26/2021 0622)  Sleep/Rest Enhancement:   awakenings minimized   noise level reduced  Patient slept for about 6 hours; she was up few times to make requests for snacks and PRN medications. She was administered Zyprexa, Melatonin, and Tylenol PRN; reported that they were \"somewhat\" effective.  "

## 2021-04-26 NOTE — PLAN OF CARE
"Srinivas behavior more organized today-does continue to c/o anxiety requesting hydroxyzine at 1000 and 1436 which she states is effective in reducing anxiety-does continue to express concern others don't like her-Received Tylenol 1300 for sore legs stating, \"I always get sore legs when I don't wear shoes.\"-active on unit throughout the day-please see note 4840 Vlad, psych assoc  "

## 2021-04-26 NOTE — PLAN OF CARE
Patient out in milieu majority of shift, appearing calmer than yesterday.  Pt attended group and watched TV, interacting with peers.  Pt still appears guarded, but did not need as much reassurance as yesterday.

## 2021-04-26 NOTE — PROGRESS NOTES
"Pt spoke to writer at 1530 to tell him she was experiencing \"night terrors\", describing like being \"stuck inside my body...like a dead person is on top of me\". She says she has been experiencing this nearly every night and when trying to take a nap. She further described feeling like her legs were trembling and tense/tight muscles in her jaw. Pt says she has experienced this in the past with some medications, and that discontinuing them alleviated these symptoms.   "

## 2021-04-26 NOTE — PROGRESS NOTES
----------------------------------------------------------------------------------------------------------  [facility], [place]   Psychiatric Progress Note  Hospital Day #6     Interim History:   The patient's care was discussed with the treatment team and chart notes were reviewed.     Sleep: 6 hours (04/26/21 0600)  Scheduled Medications: taken as prescribed - 25mg olanzapine at bedtime, 1mg lorazepam at bedtime   PRN Medications: (haldol 2 ativan 1 benadryl 25 )x1, hydroxyzine 25, olanzapine 10mg PRN  Patient took PRN 10mg olanzapine @0242, hydroxyzine 25mg @1000 this morning      Staff Report: Over the weekend, Srinivas continued to be disorganized and make freq delusional statements, often paranoid. Attended and engaged in psychotherapy group and interacting more with staff/other patients.      Patient Interview: Patient was interviewed in her personal room. Today, she reports feeling much better and really wants to discharge home to be with family. Patient was hyper verbal and had difficulty with language connections. She continues to have loose connections and talked of the [employer] trial and Hoahaoism. She slept from 2100 to 0300 last night, and said this was her baseline. She says she is a very light sleeper, woke up to a noise in the garcia, and requested PRN zyrprexa to help sleep. She also endorsed a history of 'stomach problems' that affects her eating at baseline, but she has had adequate PO intake.  She denied lightheadedness, worsening sleep, restlessness or other side effects from medication. She is engaging in group therapy, and she particularly enjoys music therapy sessions. Also reported appropriate interaction with staff and other patients. Patient denied SI, HI, SIB. She rated anxiety as a 5/10 with 10 being the worst. She noted her depression is much improved, and rated it as a 4/10. She denied hallucinations or seeing/hearing things that weren't there. She did  "mention a previous belief she had where she thought her father and ex- were going to steal her baby. She noted how they 'weren't actually here, but I thought they were coming'.     Extensive conversation  had with the patient surrounding a safe discharge plan. The patient feels like her anxiety and overall health will decrease the longer she is in the hospital because she misses her children and . She also feels music is a therapeutic for her, and previous headphones were not working. She feels much more stable now, and noted how she would be compliant with medications and appointments with outpatient therapist/psychiatrist. She also mentioned how she had stopped taking psychiatric medication previously due to concern for side effects. She notes how she would feel safe at home with her  and children. She also mentioned how she has access to a larger social support network through extended family that she did not have before. The care team validated patient's desire to go home and be with her children, and  recommend to continue hospitalization to further stabilize and titrate medication to optimize care. Patient was agreeable to stay at least one more day in the hospital.    Collateral information from conversation with patient's , Oskar.  reports noticeable improvement in K's symptoms over the weekend and into today. He feels she is currently \"pretty close to back to normal\" (\"talkative and expressive\" at baseline), although he still hasnt seen consecutive days without concerning behavior.  would like to see \"more consistency in her behavior\" prior to discharge. He noted that she is not Buddhist prior to this admission, and showing off a quote from [employer] [place], as she did today, is out of the ordinary.    He confirmed receipt of the [facility] paperwork, and is also amenable to couples therapy through [place], as requested by Srinivas.  --    The risks, benefits, " "alternatives and side effects of any medication changes have been discussed and are understood by the patient and other caregivers.    Review of systems:     ROS was negative unless noted above.          Allergies:     Allergies   Allergen Reactions     Vitamin K      Iron Rash     IV iron            Psychiatric Examination:   /73   Pulse 111   Temp 98.7  F (37.1  C) (Oral)   Resp 16   Ht 1.676 m (5' 6\")   Wt 69.9 kg (154 lb)   LMP 07/02/2014   SpO2 100%   BMI 24.86 kg/m    Weight is 154 lbs 0 oz  Body mass index is 24.86 kg/m .    Appearance: Interviewed in patient room; adequately groomed, dressed in hospital scrubs and appeared as age stated  Attitude:  cooperative and calm  Eye Contact:  good  Mood:  \"Much better\"  Affect: Elevated,  intensity is heightened, full range and reactive  Speech:  clear, coherent, pressured speech, rambling and overinclusive; hyperverbal  Psychomotor Behavior:  no evidence of tardive dyskinesia, dystonia, or tics and decreased physical agitation  Thought Process:  disorganized, illogical and tangental; flight of ideas; forward thinking/goal oriented toward discharge  Associations:  loosening of associations present  Thought Content:  Taoist preoccupation, grandiosity,  preoccupied with race  Insight:  poor - knows she has mental illness, but wants to leave hospital  Judgment:  poor - wanting to leave hospital before trevor is fully stabilized  Oriented to:  grossly oriented  Attention Span and Concentration:  limited - improving but very distractable  Recent and Remote Memory:  intact  Language: Fluent English with appropriate syntax and vocabulary; accent  Fund of Knowledge: appropriate  Muscle Strength and Tone: appears normal with no visible atrophy  Gait and Station: normal gait, intact station         Labs:   No results found for this or any previous visit (from the past 24 hour(s)).         Assessment    Principal Diagnosis:   # Bipolar disorder type 1  # rule " "out schizoaffective disorder bipolar type  # rule out substance-induced mood disorder    Secondary psychiatric diagnoses of concern this admission:   # PTSD    Diagnostic Impression:   This patient is a 25 year old female with history of PTSD, depression, and anxiety who presented to [facility] on 04/19/2021 and was subsequently transferred to [facility] due to manic symptoms and disorganized behavior. This is in the context of two weeks of worsening decompensation to psychosis and trevor-like symptoms. On admission, she displayed an elevated mood (\"chosen\") with congruent affect. She reported additional symptoms of panic, guilt, worry about family, and recent VH with ideas of reference on the TV. Substance use is likely playing a role in her presentation, as Ms. Noble uses cannabis daily with +UDS.  Family history is notable for depression, substance use, and unknown mental illness. MSE was notable for elevated mood, pressured speech, flight of ideas, Yazidism preoccupation, grandiose delusions of being chosen by God, and high distractability. Overall, this presentation is consistent with a manic episode. Given Ms. Noble's history of depression Bipolar Disorder type 1 is the most likely diagnosis. Also on the differential would be schizoaffective disorder bipolar type, less likely without a history of psychosis, and substance-induced mood disorder, given cannabis use.      Hospital course: Srinivas Noble was admitted to station 22 as a voluntary patient. Olanzapine was continued from OSH and up titrated to 25mg at bedtime. Benadryl 25/haldol 2/ativan 1 /Olanzapine 10mg BID PRN. Patient continues to show signs of trevor with grandiose delusions, but overall is improving since admission.     Discontinued Medications (& Rationale):    Medical course: Patient was medically cleared prior to admission and no medical issues have come up.    Plan   Today's changes:  - Add additional mood stabilizing agent pending " discussion with patient.  - phone call with  to discuss safe discharge plan   - Inquire about additional access to music for patient     Psychotropic Medications:  Scheduled Meds:    LORazepam  1 mg Oral At Bedtime     OLANZapine  25 mg Oral At Bedtime       PRN Meds:  -PO Haldol 2mg/Ativan 1mg/Benadryl 25mg q6H for severe anxiety, aggression, agitation, psychosis  -Hydroxyzine 25 mg every 4 hours, for anxiety  -Melatonin 3 mg nightly, for sleep  -Olanzapine 10 mg p.o./IM twice daily, for emergency use      Patient will be treated in therapeutic milieu with appropriate individual and group therapies as described.      Medical diagnoses to be addressed this admission:    #.  None    Data: See above  Consults: None    Legal Status: Voluntary    Safety Assessment:   Behavioral Orders   Procedures     Code 1 - Restrict to Unit     Elopement precautions     Routine Programming     As clinically indicated     Single Room     Status 15     Every 15 minutes.       Disposition: 3-5 days; Pending stabilization & development of a safe discharge plan.    I, myself, Francis BERNSTEIN, Dr. Addi Main MD, PhD  have seen and evaluated this patient. Attending Dr. Benita Bhakta also saw patient and agrees with the assessment and plan.    AMANDEEP Drake Psychiatry Student     I was present with the medical student who participated in the service and in the documentation of the note. I have verified the history and medical decision making. I agree with the assessment and plan of care as documented in the note.     Addi Main MD, PhD  PGY-1 Psychiatry Resident

## 2021-04-26 NOTE — PLAN OF CARE
Assessment/Intervention and Care Coordination  -Refer to psychosocial completed on 4/21/2021 for assessment/social functioning  -Chart review  -Pre round meeting with team  -Rounded with team, addressed patient needs/concerns  -Post round meeting with team    Discharge Plan or Goal  Pending stabilization & development of a safe discharge plan.  Considerations include: Return home with outpatient providers.    Barriers to Discharge and Current Symptoms  Patient requires further psychiatric stabilization due to current symptomology    Symptoms include the following: manic, disorganization, anxious and patient endorses poor sleep. Patient speech is rapid and tangential, appears confused and has a difficult time focusing on one subject.     Referral Status  None at present. Considerations to include IOP, Therapy. Patient does have psychiatry in place.    Legal Status  Patient is voluntary

## 2021-04-27 VITALS
WEIGHT: 155.4 LBS | TEMPERATURE: 98.1 F | RESPIRATION RATE: 18 BRPM | BODY MASS INDEX: 24.98 KG/M2 | OXYGEN SATURATION: 100 % | DIASTOLIC BLOOD PRESSURE: 87 MMHG | SYSTOLIC BLOOD PRESSURE: 123 MMHG | HEART RATE: 104 BPM | HEIGHT: 66 IN

## 2021-04-27 PROBLEM — F31.9 BIPOLAR 1 DISORDER (H): Status: ACTIVE | Noted: 2021-04-27

## 2021-04-27 PROCEDURE — 250N000013 HC RX MED GY IP 250 OP 250 PS 637: Performed by: PSYCHIATRY & NEUROLOGY

## 2021-04-27 PROCEDURE — 250N000013 HC RX MED GY IP 250 OP 250 PS 637: Performed by: STUDENT IN AN ORGANIZED HEALTH CARE EDUCATION/TRAINING PROGRAM

## 2021-04-27 PROCEDURE — G0177 OPPS/PHP; TRAIN & EDUC SERV: HCPCS

## 2021-04-27 PROCEDURE — 99238 HOSP IP/OBS DSCHRG MGMT 30/<: CPT | Mod: GC | Performed by: PSYCHIATRY & NEUROLOGY

## 2021-04-27 RX ORDER — LORAZEPAM 1 MG/1
1 TABLET ORAL AT BEDTIME
Qty: 30 TABLET | Refills: 0 | Status: SHIPPED | OUTPATIENT
Start: 2021-04-27

## 2021-04-27 RX ORDER — OLANZAPINE 5 MG/1
25 TABLET ORAL AT BEDTIME
Qty: 150 TABLET | Refills: 0 | Status: SHIPPED | OUTPATIENT
Start: 2021-04-27 | End: 2021-05-27

## 2021-04-27 RX ORDER — LORAZEPAM 1 MG/1
1 TABLET ORAL AT BEDTIME
Qty: 30 TABLET | Refills: 0 | Status: SHIPPED | OUTPATIENT
Start: 2021-04-27 | End: 2021-04-27

## 2021-04-27 RX ADMIN — LORAZEPAM 1 MG: 1 TABLET ORAL at 03:24

## 2021-04-27 RX ADMIN — ACETAMINOPHEN 650 MG: 325 TABLET, FILM COATED ORAL at 13:11

## 2021-04-27 RX ADMIN — HYDROXYZINE HYDROCHLORIDE 25 MG: 25 TABLET, FILM COATED ORAL at 08:24

## 2021-04-27 RX ADMIN — SALINE NASAL SPRAY 1 SPRAY: 1.5 SOLUTION NASAL at 15:00

## 2021-04-27 ASSESSMENT — ACTIVITIES OF DAILY LIVING (ADL)
LAUNDRY: WITH SUPERVISION
HYGIENE/GROOMING: INDEPENDENT
DRESS: INDEPENDENT
LAUNDRY: WITH SUPERVISION
ORAL_HYGIENE: INDEPENDENT
DRESS: INDEPENDENT
ORAL_HYGIENE: INDEPENDENT
HYGIENE/GROOMING: INDEPENDENT

## 2021-04-27 ASSESSMENT — MIFFLIN-ST. JEOR: SCORE: 1456.64

## 2021-04-27 NOTE — PLAN OF CARE
Problem: Behavioral Health Plan of Care  Goal: Absence of New-Onset Illness or Injury  Outcome: Improving   Patient at the beginning of the shift appeared to be sleeping, awake around 0300 asking for medications to help regulate her anxiety, prn Ativan 1 mg was given, patient reported somewhat of an effective results, patient was in the lounge reading, paced down the hallway, currently in her room sleeping no other concerns noted.

## 2021-04-27 NOTE — PROGRESS NOTES
"Discharge note    Pt discharged to Home at 5:25pm with referral to     Health Care Follow-up:   Psychiatry-  Appointment Date/Time: Tuesday, May 11th at 3:25pm  Psychiatrist: Andres Espinosa DO   [facility] [address] [facility]           Phone Number: [phone] Fax: [phone]  [facility] Visit    Couples Therapy-  Appointment Date/Time: Monday, June 21, at 10am   Therapist: Ginny Arredondo   Address: [address] in [place].     TBD - Partial Hospitalization intake assessment   [facility] partial hospitalization Located in [facility] Floor [address] ; [address] [place] [address] Phone:[phone]. They will call you to scheduled a diagnostic assessment      Couples therapy referrals  [facility] - [phone]  [facility] -  [phone]  [facility] - Marriage Counseling, Individual Counseling, and Neurofeedback - [phone]    All belongings returned to pt, including locker contents; no items sent to security. Discharge medications provided to pt. Pt denies any MH sx at this time, including SI, SIB, and HI.  Pt has no access to guns at home. Pt identified Protective factors as \"my children.\"      Pt escorted off unit by CRISTIAN Gerard.    Pt picked up by spouse.  "

## 2021-04-27 NOTE — PLAN OF CARE
Pt attended 2 of 3 OT groups today. Pt participated in OT clinic with MIN redirection, where she initiated a chosen project (making a picture frame for her son), followed through with plan, and asked for support with supplies as needed. Selected several songs online to share with the group. Pt reports she is feeling happy and will be going home today. Additionally, pt participated in therapeutic group activity and discussion addressing stress management. Educated pt on different types of stress as well as common signs and symptoms of stress. Pt reports communication with her  and the state of their house as primary stressors; pt ID'd expressing her wants and needs and asking for help as healthy ways to manage stressors.

## 2021-04-27 NOTE — DISCHARGE INSTRUCTIONS
Behavioral Discharge Planning and Instructions    Summary: You were admitted on 4/21/2021 due to Manic Symptomology. You were treated by Dr. Mathis and discharged on 4/27/21 from [facility] to home.    Main Diagnosis:   Bipolar disorder type 1  # rule out schizoaffective disorder bipolar type  # rule out substance-induced mood disorder    Health Care Follow-up:   Psychiatry-  Appointment Date/Time: Tuesday, May 11th at 3:25pm  Psychiatrist: Andres Espinosa DO   Address: [address] [address]           Phone Number: [phone] Fax: [phone]  [facility] Visit    Couples Therapy-  Appointment Date/Time: Monday, June 21, at 10am   Therapist: Ginny Arredondo   Address: [address] in [place].     TBD - Partial Hospitalization intake assessment   [facility] partial hospitalization Located in [facility] Floor [address] ; [address] [place] [address] Phone:[phone]. They will call you to scheduled a diagnostic assessment      Couples therapy referrals  [facility] - [phone]  [facility] -  [phone]  [facility] - Marriage Counseling, Individual Counseling, and Neurofeedback - [phone]    Attend all scheduled appointments with your outpatient providers. Call at least 24 hours in advance if you need to reschedule an appointment to ensure continued access to your outpatient providers.     Major Treatments, Procedures and Findings:  You were provided with: a psychiatric assessment, assessed for medical stability, medication evaluation and/or management, group therapy, milieu management.    Symptoms to Report: Feeling more aggressive, increased confusion, losing more sleep, mood getting worse, or thoughts of suicide.    Early warning signs can include: Increased depression or anxiety sleep disturbances increased thoughts or behaviors of suicide or self-harm  increased unusual thinking,  such as paranoia or hearing voices.    Safety and Wellness: Take all medicines as directed. Make no changes unless your doctor suggests them. Follow treatment recommendations. Refrain from alcohol and non-prescribed drugs.  Ask your support system to help you reduce your access to items that could harm yourself or others. If there is a concern for safety, call 911.    Resources:   Crisis Intervention: 951.946.6697 or 367-703-7267 (TTY: 879.975.7158).  Call anytime for help.  National [place] on [place]ness (www.[place].odilia.org): 846.922.4679 or 071-972-0988.  Suicide Awareness Voices of Education (SAVE) (www.save.org): 570-156-GWTJ (3703)  National Suicide Prevention Line (www.mentalhealthmn.org): 431-836-CIXV (9981)  Mental Health Consumer/Survivor Network of [place] (www.mhcsn.net): 321.517.9445 or 661-851-0727  [facility] Crisis Response 828-889-8914    General Medication Instructions:     See your medication sheet(s) for instructions.     Take all medicines as directed.  Make no changes unless your doctor suggests them.     Go to all your doctor visits.    Be sure to have all your required lab tests. This way, your medicines can be refilled on time.    Do not use any drugs not prescribed by your doctor.    Avoid alcohol.    Advance Directives:   Scanned document on file with [facility]? No scanned doc  Is document scanned? No. Copy Requested.  Honoring Choices Your Rights Handout: Informed and given  Was more information offered? Pt declined    The Treatment team has appreciated the opportunity to work with you. If you have any questions or concerns about your recent admission, you can contact the unit which can receive your call 24 hours a day, 7 days a week. They will be able to get in touch with a Provider if needed. The unit number is [phone] .

## 2021-04-27 NOTE — PLAN OF CARE
Problem: Manic Behavior Episode  Goal: Decreased Manic Symptoms  Outcome: Improving     SI/Self harm:  pt denies  Aggression/agitation/HI:  none  AVH:  pt denies  Sleep: awake during the night, received PRN for anxiety with good effect. NOC shift reported 4.25 hours of sleep  PRN Med: hydroxyzine for anxiety. Medication was helpful AEB calmer affect, less pacing  Medication AE: none reported or observed  Physical Complaints/Issues: none reported or observed  I & O: eating and drinking well  ADLs: independent  Vitals:  tachycardia in context of increased anxiety.   COVID 19 Assessment:  pt tested negative on 4/18/21. No current s/s of COVID19 infection  Milieu Participation: Pt is visible in the milieu, observed socializing with peers.   Behavior: overall controlled, though does show increased anxiety, somewhat disorganized at times.    Pt will discharge late this afternoon to home with appointments for psychiatry follow-up. Medications are in locked cabinet.   No other concerns at this time. Nursing will continue to monitor and assess.
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
## 2021-05-01 ENCOUNTER — RECORDS - HEALTHEAST (OUTPATIENT)
Dept: BEHAVIORAL HEALTH | Facility: CLINIC | Age: 26
End: 2021-05-01

## 2021-05-01 ENCOUNTER — HOSPITAL ENCOUNTER (EMERGENCY)
Facility: CLINIC | Age: 26
Discharge: PSYCHIATRIC OR SUBSTANCE USE FACILITY | End: 2021-05-01
Attending: PSYCHIATRY & NEUROLOGY | Admitting: PSYCHIATRY & NEUROLOGY
Payer: COMMERCIAL

## 2021-05-01 ENCOUNTER — TELEPHONE (OUTPATIENT)
Dept: BEHAVIORAL HEALTH | Facility: CLINIC | Age: 26
End: 2021-05-01

## 2021-05-01 ENCOUNTER — AMBULATORY - HEALTHEAST (OUTPATIENT)
Dept: CARE COORDINATION | Facility: HOSPITAL | Age: 26
End: 2021-05-01

## 2021-05-01 VITALS
OXYGEN SATURATION: 100 % | TEMPERATURE: 98.2 F | HEIGHT: 66 IN | WEIGHT: 154.3 LBS | HEART RATE: 76 BPM | SYSTOLIC BLOOD PRESSURE: 135 MMHG | BODY MASS INDEX: 24.8 KG/M2 | DIASTOLIC BLOOD PRESSURE: 80 MMHG | RESPIRATION RATE: 18 BRPM

## 2021-05-01 DIAGNOSIS — F29 PSYCHOSIS, UNSPECIFIED PSYCHOSIS TYPE (H): ICD-10-CM

## 2021-05-01 DIAGNOSIS — Z86.59 HISTORY OF PSYCHIATRIC DISORDER: ICD-10-CM

## 2021-05-01 LAB
LABORATORY COMMENT REPORT: NORMAL
SARS-COV-2 RNA RESP QL NAA+PROBE: NEGATIVE
SPECIMEN SOURCE: NORMAL

## 2021-05-01 PROCEDURE — 99215 OFFICE O/P EST HI 40 MIN: CPT | Performed by: PSYCHIATRY & NEUROLOGY

## 2021-05-01 PROCEDURE — 99207 PR CDG-CODE CATEGORY CHANGED: CPT | Performed by: PSYCHIATRY & NEUROLOGY

## 2021-05-01 PROCEDURE — C9803 HOPD COVID-19 SPEC COLLECT: HCPCS

## 2021-05-01 PROCEDURE — 99285 EMERGENCY DEPT VISIT HI MDM: CPT | Mod: 25

## 2021-05-01 PROCEDURE — 87635 SARS-COV-2 COVID-19 AMP PRB: CPT | Performed by: EMERGENCY MEDICINE

## 2021-05-01 PROCEDURE — 90791 PSYCH DIAGNOSTIC EVALUATION: CPT

## 2021-05-01 RX ORDER — POLYETHYLENE GLYCOL 3350 17 G/17G
17 POWDER, FOR SOLUTION ORAL DAILY
Status: DISCONTINUED | OUTPATIENT
Start: 2021-05-01 | End: 2021-05-01 | Stop reason: HOSPADM

## 2021-05-01 ASSESSMENT — MIFFLIN-ST. JEOR: SCORE: 1451.65

## 2021-05-01 NOTE — ED PROVIDER NOTES
History   Chief Complaint:  Paranoid and Manic Behavior       The history is provided by the patient and the spouse.   History limited as patient is a poor historian, and supplemented by electronic chart review    Srinivas Noble is a 25 year old female with history of bipolar disorder who presents with her  for evaluation of paranoia and manic behavior. The patient was recently diagnosed with bipolar disorder and was prescribed hydroxyzine, lorazepam, and olanzapine which she states she is taking as prescribed. She presents with a main concern that her  does not support her and would not pray with her for their children when asked. She notes other concerns including that people were more concerned with her DNA than her mental health last time she was in the hospital. No recent cough or fevers. No alcohol use. No chance of pregnancy. No suicidal or homicidal ideation.    She was hospitalized at [place] from - for similar concerns on the psychiatry service, discharged on Zyprexa 25 mg every evening, Ativan 1 mg every evening, and hydroxyzine 25 mg 3 times daily as needed.  Her  confirms that she has been taking them as prescribed, including taking her Zyprexa this evening.    Per , the patient got naked and put crystals all over her room to pray for her children, and became upset when the  did not want to do the same tonight. He acted as if he was also a patient so she would agree to come to the ED. The  is concerned because they have three children and he does not feel safe at home.     Review of Systems   Unable to perform ROS: Psychiatric disorder       Allergies:  Vitamin K  Iron    Medications:  Hydroxyzine   Lorazepam   Olanzapine     Past Medical History:     Anemia  Bipolar 1 disorder  Disorganized behavior  Manic behavior     Past Surgical History:    Appendectomy   section   Laparoscopic hysterectomy    Family History:    Diabetes    Social  History:  Presents with her   [facility] [place] [facility]  Alcohol use: negative    Physical Exam     Patient Vitals for the past 24 hrs:   BP Temp Temp src Pulse Resp SpO2   05/01/21 0101 -- 98  F (36.7  C) Temporal -- -- --   05/01/21 0027 124/77 -- -- 77 16 99 %       Physical Exam  General: Woman initially found standing in room 20,  at bedside  HENT: mucous membranes moist  Eyes: PERRL without nystagmus  CV: extremities well perfused, regular rhythm  Resp: normal effort, speaks in full phrases, no stridor, no cough observed  GI: abdomen soft and nontender, no guarding  MSK: no bony tenderness   Skin: appropriately warm and dry  Neuro: alert, clear speech, oriented, normal tone in extremities, ambulatory with steady gait  Psych: Minimally agitated but cooperative, denies feeling suicidal, tangential and disorganized speech which is minimally pressured, redirectable    Emergency Department Course     Laboratory:  Asymptomatic COVID19 Virus PCR by nasopharyngeal swab: negative     Emergency Department Course:    Reviewed:  I reviewed nursing notes, vitals, past medical history and care everywhere    Assessments:  0032 I obtained history and examined the patient as noted above.   0112 I rechecked the patient and explained findings. [facility]      Disposition:  The patient was transferred to [facility].       Impression & Plan     Medical Decision Making:  I think her presenting symptoms are likely continuing manifestations of her recently diagnosed psychiatric illness.  I note that she has had recent extensive work-up including laboratory studies that have not shown any major metabolic, infectious, or endocrinologic etiology.  She has had a hysterectomy and therefore is not pregnant.  Unfortunately, despite reported compliance with recently prescribed medications, she has evidence of decompensated psychiatric illness such that she, her , and I  all felt she would be best served by transfer to our EmPATH unit, for more comprehensive mental health evaluation.  She is medically cleared and now has a negative Covid test resulted.      Covid-19  Srinivas Noble was evaluated during a global COVID-19 pandemic, which necessitated consideration that the patient might be at risk for infection with the SARS-CoV-2 virus that causes COVID-19.   Applicable protocols for evaluation were followed during the patient's care.   COVID-19 was considered as part of the patient's evaluation. The plan for testing is:  a test was obtained during this visit.    Diagnosis:    ICD-10-CM    1. Psychosis, unspecified psychosis type (H)  F29    2. History of psychiatric disorder  Z86.59        This note was completed in part using Dragon voice recognition software. Although reviewed after completion, some word and grammatical errors may occur.    Scribe Disclosure:  Margarita MARKS, am serving as a scribe at 12:32 AM on 5/1/2021 to document services personally performed by Bar Quiroz MD based on my observations and the provider's statements to me.        Bar Quiroz MD  05/01/21 0203

## 2021-05-01 NOTE — ED NOTES
"Patient was up all night, preoccupied with Evangelical, states she is of \"Mayan ancestry\", worried about her children, and the world we live in currently.  "

## 2021-05-01 NOTE — PLAN OF CARE
Problem: Manic Behavior Episode  Goal: Decreased Manic Symptoms  Outcome: No Change  Intervention: Promote Mood Equilibrium  Flowsheets (Taken 5/1/2021 0207)  Behavior Management:   behavioral plan developed   behavioral plan reviewed  Sensory Stimulation Regulation:   lighting decreased   quiet environment promoted     Problem: Behavior Regulation Impairment (Psychotic Signs/Symptoms)  Goal: Improved Behavioral Control (Psychotic Signs/Symptoms)  Outcome: No Change  Flowsheets (Taken 5/1/2021 0207)  Mutually Determined Action Steps (Improved Behavioral Control):   identifies symptoms triggers   verbalizes personal treatment goal   identifies future-oriented goal     Problem: Sensory Perception Impairment (Psychotic Signs/Symptoms)  Goal: Decreased Sensory Symptoms (Psychotic Signs/Symptoms)  Intervention: Minimize and Manage Sensory Impairment  Recent Flowsheet Documentation  Taken 5/1/2021 0207 by Therese Toribio RN  Sensory Stimulation Regulation:   lighting decreased   quiet environment promoted     Nursing and risk assessments completed.  Assessments reviewed with LMHP and physician. Video monitoring in progress, patient informed.  Admission information reviewed with patient. Patient given a tour of EmPATH and instructions on using the facility. Questions regarding EmPATH addressed. Pt search completed and belongings inventoried.

## 2021-05-01 NOTE — ED TRIAGE NOTES
She has a new diagnosis of bipolar disorder and left the hospital AMA.  Her  is here and very frustrated.  He states he has to act like he is a patient, too, or she wouldn't check in.  Tonight she got naked and put crystals all over the room and started praying.  Asking her  to get naked, and do the same.  They have 3 children together and the  got scared at home.  This report is coming strictly from the .
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
## 2021-05-01 NOTE — ED PROVIDER NOTES
ED Psychiatric EmPATH Note  [facility] Emergency Department - EmPATH Unit    Srinivas Noble MRN: [id number]   Age: 25 year old YOB: 1995     History     Chief Complaint   Patient presents with     Paranoid     Manic Behavior     HPI  Srinivas Noble is a 25 year old female who comes in manic to the EmPATH.  She states she came in on her own to discuss her relationship with her  and look at changing religions.  Per her , she has continued to be manic, not sleeping, acting bizarrely at home and not taking her medications.  She states she is taking her medications to this provider but told another provider she is not taking her zyprexa.  The patient is pressured, hyper Zoroastrian and struggles to track.  She attempts to answer questions but then goes back to her themes of a bad relationship with her  and Jainism.  She has a history of post partum depression.  She saw her 2nd baby (part partum situation) shaky after birth and believes it was the medications she was taking. She would not take them anymore and would not take them for this last pregnancy a year ago.  She also had a hysterectomy since then which may be adding to the risk of trevor.      Per , some of the behaviors he is concerned about is lying naked on the floor with crystals all around her praying.  She was trying to convince  to do this as well.  He also has found her leaning over the children while they are sleeping doing incantations over them.  She did mention to this provider that she has some Mayan heritage and then mentioned to the psych associate that her ancestors used to do human sacrifices.  She did not say that she wanted to do this to her kids, but there is concerns that with her worsening trevor, high risk post partum situation, that this could be going to a higher risk situation.     Past Medical History  Past Medical History:   Diagnosis Date     Anemia      Past Surgical History:   Procedure  "Laterality Date     APPENDECTOMY  2013      SECTION       Laproscopic hysterectomy NOS       hydrOXYzine (ATARAX) 25 MG tablet  LORazepam (ATIVAN) 1 MG tablet  OLANZapine (ZYPREXA) 5 MG tablet      Allergies   Allergen Reactions     Vitamin K      Iron Rash     IV iron     Family History  Family History   Problem Relation Age of Onset     Diabetes Paternal Grandmother      Diabetes Maternal Grandfather      Social History   Social History     Tobacco Use     Smoking status: Never Smoker   Substance Use Topics     Alcohol use: No     Drug use: Yes     Frequency: 7.0 times per week     Types: Marijuana      Past medical history, past surgical history, medications, allergies, family history, and social history were reviewed with the patient. No additional pertinent items.       Review of Systems  A complete review of systems was performed with pertinent positives and negatives noted in the HPI, and all other systems negative.    Physical Examination   BP: 124/77  Pulse: 77  Temp: 98  F (36.7  C)  Resp: 16  Height: 167.6 cm (5' 6\")  Weight: 70 kg (154 lb 4.8 oz)  SpO2: 99 %    Physical Exam  General:  Appears stated age.   Neuro: alert and fully oriented.  Grossly normal strength.  Integumentary/Skin: no rash visualized, normal color    Psychiatric Examination   Appearance: awake, alert  Attitude:  cooperative  Eye Contact:  good  Mood:  expansive, did get sad when talking about admit but then shortly was back to being euphoric  Affect:  mood incongruent and intensity is heightened  Speech:  clear, coherent and pressured speech  Psychomotor Behavior:  no evidence of tardive dyskinesia, dystonia, or tics  Throught Process:  disorganized and tangental  Associations:  loosening of associations present  Thought Content:  no evidence of suicidal ideation or homicidal ideation and obsessions present  Insight:  limited  Judgement:  limited  Oriented to:  time, person, and place  Attention Span and Concentration:  " intact  Recent and Remote Memory:  intact    ED Course        Labs Ordered and Resulted from Time of ED Arrival Up to the Time of Departure from the ED   SARS-COV-2 (COVID-19) VIRUS RT-PCR   DOCUMENT IN LEGAL HOLD NAVIGATOR   DOCUMENT IN LEGAL HOLD NAVIGATOR   DOCUMENT       Assessments & Plan (with Medical Decision Making)   Patient presenting with trevor. Nursing notes reviewed.     I have reviewed the DEC assessment completed and dated both 4/30/21 and 5/1/21 due to addendumHansel Tran will be admitted on a 72 hour hold to station 4500 at [facility] under Dr. Alcantar.  EMTALA paperwork filled out.  She is high risk with continued trevor that is getting worse despite now her 3rd visit to the ED and one short inpatient stay that she left AMA.  She has been hovering over the kids doing incantations and praying.  During her time in EmPATH, she was talking about how her ancestors used to do sacrifices.  It is unclear how much she understands of what is going on.      Preliminary diagnosis:  Bipolar manic state    --  Vlad Dokcery MD   [facility] EMERGENCY DEPT  EmPATH Unit  5/1/2021        Vlad Dockery MD  05/01/21 [id number]

## 2021-05-01 NOTE — ED NOTES
Patient read 72 hour hold rights. Patient informed she is transferring to [facility], station 4500. Report called to CRISTIAN Frank at [facility]. Patient tearful, sad that she is being admitted. Emotional support provided. Patient pleasant and cooperative, tearful. Patient left at 1245 via [facility] ambulance. Belongings sent with patient.

## 2021-05-03 ENCOUNTER — COMMUNICATION - HEALTHEAST (OUTPATIENT)
Dept: SCHEDULING | Facility: CLINIC | Age: 26
End: 2021-05-03

## 2021-05-15 ENCOUNTER — COMMUNICATION - HEALTHEAST (OUTPATIENT)
Dept: SCHEDULING | Facility: CLINIC | Age: 26
End: 2021-05-15

## 2021-06-17 NOTE — PROGRESS NOTES
Patient cleared and ready for behavioral bed placement: Yes      S Pt is a 25 year old female on the EMPATH unit at [facility] Pt has a diagnosis of bipolar disorder. Pt is manic. Pt was recently IP and left against AMA. Pt is doing canatations of children and doing spells over kids. Last night she got naked and put crystals all over the room and started praying.  Asking her  to get naked, and do the same. They have 3 children together and the  got scared at home. Pt might be in post partum psychosis (child is 1). Pt believes she and Aaron Vance are working together on MH. Pt denies SI and HI. Pt is anemic and recently had a hysterectomy June 2020. Pt is medically cleared on EMPATH.      A 72hh     R 4500/Noe  - unit and ed aware of admission 1143am ed to call for report 1215pm
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
## 2024-07-26 ENCOUNTER — HOSPITAL ENCOUNTER (EMERGENCY)
Facility: CLINIC | Age: 29
End: 2024-07-26